# Patient Record
Sex: MALE | Race: WHITE | Employment: OTHER | URBAN - NONMETROPOLITAN AREA
[De-identification: names, ages, dates, MRNs, and addresses within clinical notes are randomized per-mention and may not be internally consistent; named-entity substitution may affect disease eponyms.]

---

## 2021-01-19 ENCOUNTER — HOSPITAL ENCOUNTER (INPATIENT)
Age: 81
LOS: 3 days | Discharge: HOME | DRG: 395 | End: 2021-01-22
Attending: SURGERY | Admitting: SURGERY
Payer: MEDICARE

## 2021-01-19 ENCOUNTER — APPOINTMENT (OUTPATIENT)
Dept: CT IMAGING | Age: 81
DRG: 395 | End: 2021-01-19
Attending: SURGERY
Payer: MEDICARE

## 2021-01-19 DIAGNOSIS — K63.89 PNEUMATOSIS INTESTINALIS: Primary | ICD-10-CM

## 2021-01-19 LAB
ANION GAP SERPL CALCULATED.3IONS-SCNC: 11 MMOL/L (ref 7–19)
BUN BLDV-MCNC: 27 MG/DL (ref 8–23)
CALCIUM SERPL-MCNC: 8.1 MG/DL (ref 8.8–10.2)
CHLORIDE BLD-SCNC: 115 MMOL/L (ref 98–111)
CO2: 15 MMOL/L (ref 22–29)
CREAT SERPL-MCNC: 1 MG/DL (ref 0.5–1.2)
GFR AFRICAN AMERICAN: >59
GFR NON-AFRICAN AMERICAN: >60
GLUCOSE BLD-MCNC: 85 MG/DL (ref 74–109)
POTASSIUM SERPL-SCNC: 4.3 MMOL/L (ref 3.5–5)
SODIUM BLD-SCNC: 141 MMOL/L (ref 136–145)

## 2021-01-19 PROCEDURE — 36415 COLL VENOUS BLD VENIPUNCTURE: CPT

## 2021-01-19 PROCEDURE — 99222 1ST HOSP IP/OBS MODERATE 55: CPT | Performed by: SURGERY

## 2021-01-19 PROCEDURE — 2580000003 HC RX 258: Performed by: SURGERY

## 2021-01-19 PROCEDURE — 6360000002 HC RX W HCPCS: Performed by: SURGERY

## 2021-01-19 PROCEDURE — 74174 CTA ABD&PLVS W/CONTRAST: CPT

## 2021-01-19 PROCEDURE — 6360000004 HC RX CONTRAST MEDICATION: Performed by: SURGERY

## 2021-01-19 PROCEDURE — 80048 BASIC METABOLIC PNL TOTAL CA: CPT

## 2021-01-19 PROCEDURE — 1210000000 HC MED SURG R&B

## 2021-01-19 PROCEDURE — 6370000000 HC RX 637 (ALT 250 FOR IP): Performed by: SURGERY

## 2021-01-19 RX ORDER — OMEPRAZOLE 20 MG/1
20 CAPSULE, DELAYED RELEASE ORAL DAILY
COMMUNITY
End: 2021-01-29

## 2021-01-19 RX ORDER — ONDANSETRON 2 MG/ML
4 INJECTION INTRAMUSCULAR; INTRAVENOUS EVERY 6 HOURS PRN
Status: DISCONTINUED | OUTPATIENT
Start: 2021-01-19 | End: 2021-01-22 | Stop reason: HOSPADM

## 2021-01-19 RX ORDER — SODIUM CHLORIDE 0.9 % (FLUSH) 0.9 %
10 SYRINGE (ML) INJECTION PRN
Status: DISCONTINUED | OUTPATIENT
Start: 2021-01-19 | End: 2021-01-22 | Stop reason: HOSPADM

## 2021-01-19 RX ORDER — ERGOCALCIFEROL 1.25 MG/1
50000 CAPSULE ORAL
Status: DISCONTINUED | OUTPATIENT
Start: 2021-01-19 | End: 2021-01-22 | Stop reason: HOSPADM

## 2021-01-19 RX ORDER — MEMANTINE HYDROCHLORIDE 5 MG/1
10 TABLET ORAL DAILY
Status: DISCONTINUED | OUTPATIENT
Start: 2021-01-19 | End: 2021-01-22 | Stop reason: HOSPADM

## 2021-01-19 RX ORDER — LEVOTHYROXINE SODIUM 0.05 MG/1
50 TABLET ORAL DAILY
Status: DISCONTINUED | OUTPATIENT
Start: 2021-01-19 | End: 2021-01-22 | Stop reason: HOSPADM

## 2021-01-19 RX ORDER — LISINOPRIL 5 MG/1
5 TABLET ORAL DAILY
COMMUNITY

## 2021-01-19 RX ORDER — ONDANSETRON 4 MG/1
4 TABLET, ORALLY DISINTEGRATING ORAL EVERY 8 HOURS PRN
Status: DISCONTINUED | OUTPATIENT
Start: 2021-01-19 | End: 2021-01-22 | Stop reason: HOSPADM

## 2021-01-19 RX ORDER — ERGOCALCIFEROL (VITAMIN D2) 1250 MCG
50000 CAPSULE ORAL
COMMUNITY
End: 2021-05-03 | Stop reason: ALTCHOICE

## 2021-01-19 RX ORDER — SODIUM CHLORIDE, SODIUM LACTATE, POTASSIUM CHLORIDE, CALCIUM CHLORIDE 600; 310; 30; 20 MG/100ML; MG/100ML; MG/100ML; MG/100ML
INJECTION, SOLUTION INTRAVENOUS CONTINUOUS
Status: DISCONTINUED | OUTPATIENT
Start: 2021-01-19 | End: 2021-01-22 | Stop reason: HOSPADM

## 2021-01-19 RX ORDER — SODIUM CHLORIDE 0.9 % (FLUSH) 0.9 %
10 SYRINGE (ML) INJECTION EVERY 12 HOURS SCHEDULED
Status: DISCONTINUED | OUTPATIENT
Start: 2021-01-19 | End: 2021-01-22 | Stop reason: HOSPADM

## 2021-01-19 RX ORDER — CHOLESTYRAMINE
4 POWDER (GRAM) MISCELLANEOUS DAILY
Status: ON HOLD | COMMUNITY
End: 2021-01-22 | Stop reason: HOSPADM

## 2021-01-19 RX ORDER — ACETAMINOPHEN 325 MG/1
325 TABLET ORAL EVERY 6 HOURS
Status: DISCONTINUED | OUTPATIENT
Start: 2021-01-19 | End: 2021-01-22 | Stop reason: HOSPADM

## 2021-01-19 RX ORDER — MORPHINE SULFATE 4 MG/ML
2 INJECTION, SOLUTION INTRAMUSCULAR; INTRAVENOUS
Status: DISCONTINUED | OUTPATIENT
Start: 2021-01-19 | End: 2021-01-22 | Stop reason: HOSPADM

## 2021-01-19 RX ORDER — ACETAMINOPHEN 325 MG/1
650 TABLET ORAL EVERY 4 HOURS PRN
COMMUNITY

## 2021-01-19 RX ORDER — MEMANTINE HYDROCHLORIDE 10 MG/1
10 TABLET ORAL 2 TIMES DAILY
COMMUNITY

## 2021-01-19 RX ORDER — LISINOPRIL 5 MG/1
5 TABLET ORAL DAILY
Status: DISCONTINUED | OUTPATIENT
Start: 2021-01-19 | End: 2021-01-22 | Stop reason: HOSPADM

## 2021-01-19 RX ORDER — METOPROLOL SUCCINATE 25 MG/1
25 TABLET, EXTENDED RELEASE ORAL 2 TIMES DAILY
Status: DISCONTINUED | OUTPATIENT
Start: 2021-01-19 | End: 2021-01-22 | Stop reason: HOSPADM

## 2021-01-19 RX ORDER — LEVOTHYROXINE SODIUM 0.05 MG/1
50 TABLET ORAL DAILY
COMMUNITY

## 2021-01-19 RX ORDER — PANTOPRAZOLE SODIUM 40 MG/1
40 TABLET, DELAYED RELEASE ORAL
Status: DISCONTINUED | OUTPATIENT
Start: 2021-01-20 | End: 2021-01-22 | Stop reason: HOSPADM

## 2021-01-19 RX ORDER — DOXYCYCLINE HYCLATE 100 MG/1
100 CAPSULE ORAL 2 TIMES DAILY
Status: ON HOLD | COMMUNITY
End: 2021-01-19 | Stop reason: ALTCHOICE

## 2021-01-19 RX ORDER — METOPROLOL SUCCINATE 25 MG/1
12.5 TABLET, EXTENDED RELEASE ORAL 2 TIMES DAILY
COMMUNITY

## 2021-01-19 RX ADMIN — SODIUM CHLORIDE, POTASSIUM CHLORIDE, SODIUM LACTATE AND CALCIUM CHLORIDE: 600; 310; 30; 20 INJECTION, SOLUTION INTRAVENOUS at 15:16

## 2021-01-19 RX ADMIN — SODIUM CHLORIDE, PRESERVATIVE FREE 10 ML: 5 INJECTION INTRAVENOUS at 20:44

## 2021-01-19 RX ADMIN — ACETAMINOPHEN 325 MG: 325 TABLET ORAL at 20:44

## 2021-01-19 RX ADMIN — MORPHINE SULFATE 2 MG: 4 INJECTION, SOLUTION INTRAMUSCULAR; INTRAVENOUS at 15:21

## 2021-01-19 RX ADMIN — IOPAMIDOL 90 ML: 755 INJECTION, SOLUTION INTRAVENOUS at 19:46

## 2021-01-19 ASSESSMENT — ENCOUNTER SYMPTOMS
SINUS PAIN: 0
VOMITING: 0
WHEEZING: 0
CONSTIPATION: 0
BLOOD IN STOOL: 0
DIARRHEA: 1
TROUBLE SWALLOWING: 0
SHORTNESS OF BREATH: 0
COLOR CHANGE: 0
ABDOMINAL DISTENTION: 0
NAUSEA: 0
ABDOMINAL PAIN: 1
SINUS PRESSURE: 0
COUGH: 0

## 2021-01-19 ASSESSMENT — PAIN SCALES - GENERAL
PAINLEVEL_OUTOF10: 7
PAINLEVEL_OUTOF10: 4
PAINLEVEL_OUTOF10: 2

## 2021-01-19 NOTE — H&P
[facility] General Surgery     History and Physical    Patient ID: Rosetta Fry  68 y.o.  male  YOB: 1940    Admitting Diagnosis: Small bowel obstruction    Subjective:      Mr. Fran Hernandez is a very pleasant 72-year-old gentleman accepted in transfer from [facility] this morning for evaluation of abdominal pain pneumatosis of the intestine. He notes that he over the last week or so he has had problems with abdominal pain and mild nausea. He reports a longstanding history of small bowel obstruction and is undergone surgery on several occasions for this. By his report he is not had to have any bowel resections. Beginning about a week ago he notes that he would eat breakfast and feel well would have lunch and feel well but by supper would feel full and bloated and not able to eat. During this. He has been having regular bowel movements and as well passing flatus. He denies fever or chills. He has not had any nausea or vomiting. He reports about a 20 pound weight loss over the last year or so. Last colonoscopy was about 6 years ago with a normal study and a recommendation for 10-year follow-up. He has not seen any blood in his stool. He notes no mucus or other abnormality. His wife reports that he has had 1 or 2 episodes of loose stools but not liquid diarrhea. After awakening this morning he noted that he just did not feel well and presented to the emergency department at [facility] in [place]. Work-up there showed him to be slightly tender. CT scan of the abdomen shows extensive pneumatosis involving the entire length of the small bowel as well as a portion of the colon. Given this finding he was transferred here for surgical evaluation.     Past Medical History:   Diagnosis Date    Arthritis     Asthma     Hypertension      Past Surgical History:   Procedure Laterality Date    ABDOMEN SURGERY      COLONOSCOPY  FRACTURE SURGERY       No current facility-administered medications on file prior to encounter. Current Outpatient Medications on File Prior to Encounter   Medication Sig Dispense Refill    acetaminophen (TYLENOL) 325 MG tablet Take 650 mg by mouth every 4 hours as needed for Pain      Cholestyramine POWD 4 g by Does not apply route daily      ergocalciferol (ERGOCALCIFEROL) 1.25 MG (53623 UT) capsule Take 50,000 Units by mouth twice a week       lisinopril (PRINIVIL;ZESTRIL) 5 MG tablet Take 5 mg by mouth daily      metoprolol succinate (TOPROL XL) 25 MG extended release tablet Take 25 mg by mouth 2 times daily Half tab      omeprazole (PRILOSEC) 20 MG delayed release capsule Take 20 mg by mouth daily      memantine (NAMENDA) 10 MG tablet Take 10 mg by mouth daily      levothyroxine (SYNTHROID) 50 MCG tablet Take 50 mcg by mouth Daily       Allergies: Penicillins    No family history on file. Social History     Tobacco Use    Smoking status: Former Smoker     Types: Cigarettes     Quit date: 1982     Years since quittin.0    Smokeless tobacco: Never Used   Substance Use Topics    Alcohol use: Not on file     Review of Systems   Constitutional: Positive for appetite change and unexpected weight change. Negative for activity change, chills, diaphoresis and fever. HENT: Positive for hearing loss. Negative for congestion, sinus pressure, sinus pain and trouble swallowing. Respiratory: Negative for cough, shortness of breath and wheezing. Cardiovascular: Negative for chest pain, palpitations and leg swelling. Gastrointestinal: Positive for abdominal pain and diarrhea. Negative for abdominal distention, blood in stool, constipation, nausea and vomiting. Genitourinary: Negative for difficulty urinating, frequency and urgency. Skin: Negative for color change. Psychiatric/Behavioral: Negative for confusion. The patient is not nervous/anxious.         Objective: BP 92/73   Pulse 80   Temp 97.6 °F (36.4 °C) (Temporal)   Resp 18   SpO2 94%   No intake or output data in the 24 hours ending 01/19/21 1449  Physical Exam  Vitals signs reviewed. Constitutional:       Appearance: Normal appearance. He is not ill-appearing. HENT:      Head: Normocephalic and atraumatic. Mouth/Throat:      Mouth: Mucous membranes are moist.      Pharynx: Oropharynx is clear. Eyes:      General: No scleral icterus. Extraocular Movements: Extraocular movements intact. Conjunctiva/sclera: Conjunctivae normal.   Neck:      Musculoskeletal: Neck supple. Cardiovascular:      Rate and Rhythm: Normal rate and regular rhythm. Heart sounds: Normal heart sounds. Comments: Heart sounds are faint and distant  Pulmonary:      Effort: Pulmonary effort is normal.      Breath sounds: Normal breath sounds. No wheezing or rales. Abdominal:      Comments: His abdomen is nondistended and soft. Well-healed midline incisions and without evidence of a hernia. No mass or organomegaly appreciated. No tenderness noted. Bowel sounds are normal in character. Neurological:      Mental Status: He is alert. Data: Laboratory studies obtained earlier this morning at [facility] are reviewed. Creatinine is 1.4 otherwise no pertinent findings noted. Imaging: I reviewed the CT images sent by disc from [facility].  There is extensive pneumatosis involving the small bowel and a portion of the colon. Small bowel loops are not distended but are fluid-filled. No transition zone noted. There is stool and gas within the colon all the way to the upper rectum.       Assessment: 1.  Mild abdominal pain with extensive associated pneumatosis intestinalis. No clinical, laboratory or radiologic evidence of bowel perforation or significant ischemia. Review of the CT does show some areas of calcified vessels in the abdomen and thus chronic mesenteric ischemia is not excluded. 2.  Given his presenting complaints and 20 pound weight loss an occult malignancy is also not excluded. 3.  Elevated serum creatinine. Unsure if this reflects mild dehydration or is his baseline as we have no prior laboratories for comparison. Plan:     1. IV fluid hydration  2.  CT angiogram of the abdomen to look for evidence of disease involving the superior mesenteric artery or celiac axis. 3.  He is clinically stable and does not require urgent laparotomy. 4.  Home medications have been reordered and appropriate hospital admission orders have been placed in [facility]. 5.  I spoke with . Marciano Hayden and his wife regarding my thoughts on his presentation and the next steps in his work-up and I answered their questions.     Randy Calixto MD

## 2021-01-20 PROBLEM — I10 ESSENTIAL HYPERTENSION: Status: ACTIVE | Noted: 2021-01-20

## 2021-01-20 PROBLEM — E03.8 OTHER SPECIFIED HYPOTHYROIDISM: Status: ACTIVE | Noted: 2021-01-20

## 2021-01-20 LAB
ALBUMIN SERPL-MCNC: 3.1 G/DL (ref 3.5–5.2)
ALP BLD-CCNC: 71 U/L (ref 40–130)
ALT SERPL-CCNC: 10 U/L (ref 5–41)
ANION GAP SERPL CALCULATED.3IONS-SCNC: 10 MMOL/L (ref 7–19)
AST SERPL-CCNC: 12 U/L (ref 5–40)
BASOPHILS ABSOLUTE: 0 K/UL (ref 0–0.2)
BASOPHILS RELATIVE PERCENT: 0.2 % (ref 0–1)
BILIRUB SERPL-MCNC: 0.4 MG/DL (ref 0.2–1.2)
BUN BLDV-MCNC: 26 MG/DL (ref 8–23)
CA 19-9: 9 U/ML (ref 0–35)
CALCIUM SERPL-MCNC: 8.3 MG/DL (ref 8.8–10.2)
CEA: 4.2 NG/ML (ref 0–4.7)
CHLORIDE BLD-SCNC: 111 MMOL/L (ref 98–111)
CO2: 20 MMOL/L (ref 22–29)
CREAT SERPL-MCNC: 1.1 MG/DL (ref 0.5–1.2)
EOSINOPHILS ABSOLUTE: 0.7 K/UL (ref 0–0.6)
EOSINOPHILS RELATIVE PERCENT: 13.3 % (ref 0–5)
GFR AFRICAN AMERICAN: >59
GFR NON-AFRICAN AMERICAN: >60
GLUCOSE BLD-MCNC: 72 MG/DL (ref 74–109)
HCT VFR BLD CALC: 41.1 % (ref 42–52)
HEMOGLOBIN: 13 G/DL (ref 14–18)
IMMATURE GRANULOCYTES #: 0 K/UL
LACTIC ACID: 0.7 MMOL/L (ref 0.5–1.9)
LYMPHOCYTES ABSOLUTE: 0.8 K/UL (ref 1.1–4.5)
LYMPHOCYTES RELATIVE PERCENT: 14.8 % (ref 20–40)
MCH RBC QN AUTO: 33.2 PG (ref 27–31)
MCHC RBC AUTO-ENTMCNC: 31.6 G/DL (ref 33–37)
MCV RBC AUTO: 105.1 FL (ref 80–94)
MONOCYTES ABSOLUTE: 0.4 K/UL (ref 0–0.9)
MONOCYTES RELATIVE PERCENT: 8.4 % (ref 0–10)
NEUTROPHILS ABSOLUTE: 3.2 K/UL (ref 1.5–7.5)
NEUTROPHILS RELATIVE PERCENT: 62.9 % (ref 50–65)
PDW BLD-RTO: 14.8 % (ref 11.5–14.5)
PLATELET # BLD: 297 K/UL (ref 130–400)
PMV BLD AUTO: 10.3 FL (ref 9.4–12.4)
POTASSIUM REFLEX MAGNESIUM: 4.3 MMOL/L (ref 3.5–5)
RBC # BLD: 3.91 M/UL (ref 4.7–6.1)
SODIUM BLD-SCNC: 141 MMOL/L (ref 136–145)
TOTAL PROTEIN: 4.7 G/DL (ref 6.6–8.7)
VITAMIN B-12: 391 PG/ML (ref 211–946)
WBC # BLD: 5.1 K/UL (ref 4.8–10.8)

## 2021-01-20 PROCEDURE — 80053 COMPREHEN METABOLIC PANEL: CPT

## 2021-01-20 PROCEDURE — 6370000000 HC RX 637 (ALT 250 FOR IP): Performed by: INTERNAL MEDICINE

## 2021-01-20 PROCEDURE — 99231 SBSQ HOSP IP/OBS SF/LOW 25: CPT | Performed by: SURGERY

## 2021-01-20 PROCEDURE — 6370000000 HC RX 637 (ALT 250 FOR IP): Performed by: SURGERY

## 2021-01-20 PROCEDURE — 2580000003 HC RX 258: Performed by: SURGERY

## 2021-01-20 PROCEDURE — 85025 COMPLETE CBC W/AUTO DIFF WBC: CPT

## 2021-01-20 PROCEDURE — 82607 VITAMIN B-12: CPT

## 2021-01-20 PROCEDURE — 82378 CARCINOEMBRYONIC ANTIGEN: CPT

## 2021-01-20 PROCEDURE — 36415 COLL VENOUS BLD VENIPUNCTURE: CPT

## 2021-01-20 PROCEDURE — 99222 1ST HOSP IP/OBS MODERATE 55: CPT | Performed by: INTERNAL MEDICINE

## 2021-01-20 PROCEDURE — 1210000000 HC MED SURG R&B

## 2021-01-20 PROCEDURE — 83605 ASSAY OF LACTIC ACID: CPT

## 2021-01-20 PROCEDURE — 6360000002 HC RX W HCPCS: Performed by: SURGERY

## 2021-01-20 PROCEDURE — 86301 IMMUNOASSAY TUMOR CA 19-9: CPT

## 2021-01-20 RX ORDER — METRONIDAZOLE 500 MG/1
500 TABLET ORAL EVERY 12 HOURS SCHEDULED
Status: DISCONTINUED | OUTPATIENT
Start: 2021-01-20 | End: 2021-01-22 | Stop reason: HOSPADM

## 2021-01-20 RX ORDER — BISACODYL 10 MG
10 SUPPOSITORY, RECTAL RECTAL ONCE
Status: COMPLETED | OUTPATIENT
Start: 2021-01-20 | End: 2021-01-20

## 2021-01-20 RX ADMIN — ACETAMINOPHEN 325 MG: 325 TABLET ORAL at 08:36

## 2021-01-20 RX ADMIN — LISINOPRIL 5 MG: 5 TABLET ORAL at 08:36

## 2021-01-20 RX ADMIN — METOPROLOL SUCCINATE 25 MG: 25 TABLET, EXTENDED RELEASE ORAL at 08:36

## 2021-01-20 RX ADMIN — MEMANTINE HYDROCHLORIDE 10 MG: 5 TABLET ORAL at 08:36

## 2021-01-20 RX ADMIN — ACETAMINOPHEN 325 MG: 325 TABLET ORAL at 20:19

## 2021-01-20 RX ADMIN — METOPROLOL SUCCINATE 25 MG: 25 TABLET, EXTENDED RELEASE ORAL at 20:19

## 2021-01-20 RX ADMIN — MORPHINE SULFATE 2 MG: 4 INJECTION, SOLUTION INTRAMUSCULAR; INTRAVENOUS at 00:41

## 2021-01-20 RX ADMIN — ACETAMINOPHEN 325 MG: 325 TABLET ORAL at 15:06

## 2021-01-20 RX ADMIN — MORPHINE SULFATE 2 MG: 4 INJECTION, SOLUTION INTRAMUSCULAR; INTRAVENOUS at 15:06

## 2021-01-20 RX ADMIN — MORPHINE SULFATE 2 MG: 4 INJECTION, SOLUTION INTRAMUSCULAR; INTRAVENOUS at 08:37

## 2021-01-20 RX ADMIN — SODIUM CHLORIDE, POTASSIUM CHLORIDE, SODIUM LACTATE AND CALCIUM CHLORIDE: 600; 310; 30; 20 INJECTION, SOLUTION INTRAVENOUS at 00:41

## 2021-01-20 RX ADMIN — METRONIDAZOLE 500 MG: 500 TABLET ORAL at 20:19

## 2021-01-20 RX ADMIN — BISACODYL 10 MG: 10 SUPPOSITORY RECTAL at 11:39

## 2021-01-20 RX ADMIN — LEVOTHYROXINE SODIUM 50 MCG: 50 TABLET ORAL at 05:16

## 2021-01-20 RX ADMIN — PANTOPRAZOLE SODIUM 40 MG: 40 TABLET, DELAYED RELEASE ORAL at 05:16

## 2021-01-20 ASSESSMENT — ENCOUNTER SYMPTOMS
ALLERGIC/IMMUNOLOGIC NEGATIVE: 1
BLOOD IN STOOL: 0
ABDOMINAL DISTENTION: 1
ABDOMINAL PAIN: 1
CHEST TIGHTNESS: 1
SHORTNESS OF BREATH: 1
EYES NEGATIVE: 1
VOMITING: 0
BACK PAIN: 1
DIARRHEA: 1

## 2021-01-20 ASSESSMENT — PAIN SCALES - GENERAL
PAINLEVEL_OUTOF10: 9
PAINLEVEL_OUTOF10: 9
PAINLEVEL_OUTOF10: 5
PAINLEVEL_OUTOF10: 1
PAINLEVEL_OUTOF10: 5

## 2021-01-20 NOTE — CONSULTS
Consult Note            Date:1/20/2021        Patient Name:Miles Mcneill     YOB: 1940     Age:80 y.o. Inpatient consult to GI  Consult performed by: Rohan Busch MD  Consult ordered by: Susana Robb DO  Reason for consult: Pneumatosis Intestinalis          Chief Complaint   No chief complaint on file. History Obtained From   Patient and wife    History of Present Illness    Mr. Geovanna Almonte is a pleasant 69 y/o male who presented to OSH with abdominal pain and distention. The patient reports that he has had multiple bowel obstructions in the past.  Per the wife the initial obstruction was felt to be secondary to a small bowel volvulus. The patient was taken to surgery however no resection was performed and the obstruction resolved spontaneously. His wife reports that he has had 4 surgeries for lysis of adhesions since that time. He last obstruction was a few years ago. He has had extensive GI evaluation in the past, predominantly when this problem began. He was evaluated in [place] and diagnosed with SIBO by Hydrogen Breath Test.  He did take antibiotics at that time which did help. He was also tested for Celiac disease which was negative. The patient does report chronic diarrhea and has had numerous stool studies for evaluation. He recently started taking Cholestyramine which seems to be helping. He also recently received IV antibiotics for a hand infection. He believes this also made his stomach feel better. This recent episode was been different from previous. He has not had significant pain and denies vomiting. His main symptom is abdominal distention.       Past Medical History     Past Medical History:   Diagnosis Date    Arthritis     Asthma     Hypertension     Subdural hematoma ([facility] [address] 08/2019    Thyroid disease         Past Surgical History     Past Surgical History:   Procedure Laterality Date    ABDOMEN SURGERY      COLONOSCOPY      FRACTURE SURGERY Medications     Prior to Admission medications    Medication Sig Start Date End Date Taking?  Authorizing Provider   acetaminophen (TYLENOL) 325 MG tablet Take 650 mg by mouth every 4 hours as needed for Pain   Yes Historical Provider, MD   Cholestyramine POWD 4 g by Does not apply route daily   Yes Historical Provider, MD   ergocalciferol (ERGOCALCIFEROL) 1.25 MG (67773 UT) capsule Take 50,000 Units by mouth twice a week tuesday thursday   Yes Historical Provider, MD   lisinopril (PRINIVIL;ZESTRIL) 5 MG tablet Take 5 mg by mouth daily   Yes Historical Provider, MD   metoprolol succinate (TOPROL XL) 25 MG extended release tablet Take 25 mg by mouth 2 times daily Half tab   Yes Historical Provider, MD   omeprazole (PRILOSEC) 20 MG delayed release capsule Take 20 mg by mouth daily   Yes Historical Provider, MD   memantine (NAMENDA) 10 MG tablet Take 10 mg by mouth daily   Yes Historical Provider, MD   levothyroxine (SYNTHROID) 50 MCG tablet Take 50 mcg by mouth Daily    Historical Provider, MD            vitamin D (ERGOCALCIFEROL) capsule 50,000 Units, Once per day on Mon Thu      levothyroxine (SYNTHROID) tablet 50 mcg, Daily      lisinopril (PRINIVIL;ZESTRIL) tablet 5 mg, Daily      memantine (NAMENDA) tablet 10 mg, Daily      metoprolol succinate (TOPROL XL) extended release tablet 25 mg, BID      pantoprazole (PROTONIX) tablet 40 mg, QAM AC      sodium chloride flush 0.9 % injection 10 mL, 2 times per day      sodium chloride flush 0.9 % injection 10 mL, PRN      ondansetron (ZOFRAN-ODT) disintegrating tablet 4 mg, Q8H PRN    Or      ondansetron (ZOFRAN) injection 4 mg, Q6H PRN      lactated ringers infusion, Continuous      acetaminophen (TYLENOL) tablet 325 mg, Q6H      morphine injection 2 mg, Q1H PRN        Allergies   Penicillins    Social History     Social History     Tobacco History     Smoking Status  Former Smoker Quit date  1/1/1982 Smoking Tobacco Type  Cigarettes    Smokeless Tobacco Use Never Used          Alcohol History     Alcohol Use Status  Not Asked          Drug Use     Drug Use Status  Not Asked          Sexual Activity     Sexually Active  Not Asked                Family History   No family history on file. Review of Systems   Review of Systems   Constitutional: Positive for appetite change and unexpected weight change. Negative for chills and fever. HENT: Negative. Eyes: Negative. Respiratory: Positive for chest tightness and shortness of breath. Cardiovascular: Positive for chest pain. Negative for leg swelling. Gastrointestinal: Positive for abdominal distention, abdominal pain and diarrhea. Negative for blood in stool and vomiting. Endocrine: Negative. Genitourinary: Negative. Musculoskeletal: Positive for arthralgias and back pain. Allergic/Immunologic: Negative. Neurological: Negative. Hematological: Negative. Psychiatric/Behavioral: Negative. Physical Exam   BP 95/66   Pulse 73   Temp 97.4 °F (36.3 °C) (Temporal)   Resp 18   SpO2 93%      Physical Exam  Vitals signs reviewed. Constitutional:       General: He is not in acute distress. HENT:      Head: Normocephalic. Mouth/Throat:      Mouth: Mucous membranes are dry. Eyes:      Pupils: Pupils are equal, round, and reactive to light. Neck:      Musculoskeletal: Neck supple. Cardiovascular:      Rate and Rhythm: Normal rate and regular rhythm. Heart sounds: Normal heart sounds. Pulmonary:      Effort: Pulmonary effort is normal.      Breath sounds: Normal breath sounds. Abdominal:      General: Bowel sounds are normal. There is no distension. Palpations: Abdomen is soft. There is no mass. Tenderness: There is no abdominal tenderness. Skin:     General: Skin is warm. Neurological:      General: No focal deficit present. Mental Status: He is alert.    Psychiatric:         Mood and Affect: Mood normal.         Labs    CBC:  Recent Labs     01/20/21 0502   WBC 5.1   RBC 3.91*   HGB 13.0*   HCT 41.1*   .1*   RDW 14.8*        CHEMISTRIES:  Recent Labs     01/19/21  1704 01/20/21  0502    141   K 4.3 4.3   * 111   CO2 15* 20*   BUN 27* 26*   CREATININE 1.0 1.1   GLUCOSE 85 72*     PT/INR:No results for input(s): PROTIME, INR in the last 72 hours. APTT:No results for input(s): APTT in the last 72 hours. LIVER PROFILE:  Recent Labs     01/20/21  0502   AST 12   ALT 10   BILITOT 0.4   ALKPHOS 71       Imaging/Diagnostics   Cta Abdomen Pelvis W Wo Contrast    Result Date: 1/19/2021  1. Extensive dilated small bowel with pneumatosis, which has mass effect on the stomach, colon and a small amount of decompressed distal right lower quadrant small bowel. Amount of pneumatosis limits evaluation for free air. Degree of small bowel dilation, paperthin bowel wall and pneumatosis are concerning for ischemia. However, other causes of pneumatosis are not excluded. 2. Ascending thoracic aorta enlarged measuring 4 cm. Vasculature patent without stenosis, dissection or aneurysm as described above. 3. Punctate left nonobstructing calculus.  Signed by Dr Davin Archibald on 1/19/2021 8:18 PM      Assessment      Hospital Problems           Last Modified POA    Pneumatosis intestinalis 1/19/2021 Yes    Other specified hypothyroidism 1/20/2021 Yes    Essential hypertension 1/20/2021 Yes Patient has had recurrent SBOs with multiple surgeries for lysis of adhesions. He was diagnosed with SIBO via Hydrogen Breath Test in [address] [place] [address] years ago. He does report chronic diarrhea which could be secondary to SIBO. He was treated with antibiotics at the time of diagnosis of SIBO and also believes that recent antibiotics taken for hand infection did help with his GI symptoms. He also is noted to have elevated MCV which may be indicative of B12 def which would also fit with a diagnosis of SIBO. He certainly does not appear toxic at this time and will likely respond well to conservative measures. Plan   1. Okay to begin Clear Liquid diet and advance to low FODMAPs diet. 2.  Begin taking Flagyl 500 mg BID for 14 days and would consider cycling this every 3 months. 3.  Check serum B12. Will follow.     Electronically signed by Anand Sierra MD on 1/20/21 at 2:43 PM CST

## 2021-01-20 NOTE — PROGRESS NOTES
[facility] General Surgery Progress Note    Chief Complaint: abd pain    SUBJECTIVE:  Mr. Mau Brown is a 68 y.o. male is seen and examined at bedside. He notes mild LLQ abdominal pain this morning that resolved with medication. He passed flatus yesterday but denies that today. He is tolerating sips and ice chips. He is wanting soup to eat. He states he had a colonoscopy 6 years ago that was normal. He is without nausea. He has not had  BM. OBJECTIVE:  /76   Pulse 80   Temp 97.1 °F (36.2 °C) (Temporal)   Resp 18   SpO2 94%   CONSTITUTIONAL: Alert, appropriate, no acute distress  SKIN: warm, dry with no rashes or lesions  HEENT: NCAT, Non icteric, PERR. Trachea Midline. CHEST/LUNGS: CTA bilaterally. Normal respiratory effort. CARDIOVASCULAR: RRR, No edema. ABDOMEN: soft, ND, minimally TTP, +BS, right sided pain pump in place. NEUROLOGIC: CN II-XI grossly intact, no motor or sensory deficits   MUSCULOSKELETAL: No clubbing or cyanosis. PSYCHIATRIC:  Normal Mood and Affect. Alert and Oriented. Labs:  CBC:   Recent Labs     01/20/21  0502   WBC 5.1   HGB 13.0*        BMP:    Recent Labs     01/19/21  1704 01/20/21  0502    141   K 4.3 4.3   * 111   CO2 15* 20*   BUN 27* 26*   CREATININE 1.0 1.1   GLUCOSE 85 72*     Lactic Acid   Lactic Acid 0.7  0.5 - 1.9 mmol/L Final 01/20/2021  5:02 AM [facility]       CTA 1/19/2021      Impression   1. Extensive dilated small bowel with pneumatosis, which has mass   effect on the stomach, colon and a small amount of decompressed distal   right lower quadrant small bowel. Amount of pneumatosis limits   evaluation for free air. Degree of small bowel dilation, paperthin   bowel wall and pneumatosis are concerning for ischemia. However, other   causes of pneumatosis are not excluded. 2. Ascending thoracic aorta enlarged measuring 4 cm. Vasculature   patent without stenosis, dissection or aneurysm as described above. 3. Punctate left nonobstructing calculus. Signed by Dr Surendra Roger on 1/19/2021 8:18 PM           ASSESSMENT:    Pneumatosis intestinalis--unknown cause. Other specified hypothyroidism    Essential hypertension      PLAN:  No clear signs of ischemia or warranting an emergent or urgent laparotomy. Will consult GI for further evaluation, as it is not clear as to a cause of his symptoms or recent significant weight loss. If GI is not opposed or planning intervention, can start clear liquid diet. Will give dulcolax suppository to assist with BM. Continue medications to manage chronic medical problems.      Sanaz Regan DO   Electronically Signed on 1/20/2021 at 10:45 AM

## 2021-01-21 ENCOUNTER — APPOINTMENT (OUTPATIENT)
Dept: GENERAL RADIOLOGY | Age: 81
DRG: 395 | End: 2021-01-21
Attending: SURGERY
Payer: MEDICARE

## 2021-01-21 LAB
ANION GAP SERPL CALCULATED.3IONS-SCNC: 10 MMOL/L (ref 7–19)
BASOPHILS ABSOLUTE: 0 K/UL (ref 0–0.2)
BASOPHILS RELATIVE PERCENT: 0.2 % (ref 0–1)
BUN BLDV-MCNC: 21 MG/DL (ref 8–23)
CALCIUM SERPL-MCNC: 8 MG/DL (ref 8.8–10.2)
CHLORIDE BLD-SCNC: 109 MMOL/L (ref 98–111)
CO2: 19 MMOL/L (ref 22–29)
CREAT SERPL-MCNC: 0.9 MG/DL (ref 0.5–1.2)
EOSINOPHILS ABSOLUTE: 0.5 K/UL (ref 0–0.6)
EOSINOPHILS RELATIVE PERCENT: 8.9 % (ref 0–5)
GFR AFRICAN AMERICAN: >59
GFR NON-AFRICAN AMERICAN: >60
GLUCOSE BLD-MCNC: 98 MG/DL (ref 74–109)
HCT VFR BLD CALC: 37.8 % (ref 42–52)
HEMOGLOBIN: 12.4 G/DL (ref 14–18)
IMMATURE GRANULOCYTES #: 0 K/UL
LYMPHOCYTES ABSOLUTE: 0.6 K/UL (ref 1.1–4.5)
LYMPHOCYTES RELATIVE PERCENT: 9.6 % (ref 20–40)
MCH RBC QN AUTO: 33.5 PG (ref 27–31)
MCHC RBC AUTO-ENTMCNC: 32.8 G/DL (ref 33–37)
MCV RBC AUTO: 102.2 FL (ref 80–94)
MONOCYTES ABSOLUTE: 0.6 K/UL (ref 0–0.9)
MONOCYTES RELATIVE PERCENT: 10.7 % (ref 0–10)
NEUTROPHILS ABSOLUTE: 4.1 K/UL (ref 1.5–7.5)
NEUTROPHILS RELATIVE PERCENT: 70.4 % (ref 50–65)
PDW BLD-RTO: 14.7 % (ref 11.5–14.5)
PLATELET # BLD: 312 K/UL (ref 130–400)
PMV BLD AUTO: 10.1 FL (ref 9.4–12.4)
POTASSIUM REFLEX MAGNESIUM: 3.8 MMOL/L (ref 3.5–5)
RBC # BLD: 3.7 M/UL (ref 4.7–6.1)
SODIUM BLD-SCNC: 138 MMOL/L (ref 136–145)
WBC # BLD: 5.8 K/UL (ref 4.8–10.8)

## 2021-01-21 PROCEDURE — 85025 COMPLETE CBC W/AUTO DIFF WBC: CPT

## 2021-01-21 PROCEDURE — 99231 SBSQ HOSP IP/OBS SF/LOW 25: CPT | Performed by: SURGERY

## 2021-01-21 PROCEDURE — 6370000000 HC RX 637 (ALT 250 FOR IP): Performed by: SURGERY

## 2021-01-21 PROCEDURE — 1210000000 HC MED SURG R&B

## 2021-01-21 PROCEDURE — 2580000003 HC RX 258: Performed by: SURGERY

## 2021-01-21 PROCEDURE — 6360000002 HC RX W HCPCS: Performed by: SURGERY

## 2021-01-21 PROCEDURE — 80048 BASIC METABOLIC PNL TOTAL CA: CPT

## 2021-01-21 PROCEDURE — 36415 COLL VENOUS BLD VENIPUNCTURE: CPT

## 2021-01-21 PROCEDURE — 6370000000 HC RX 637 (ALT 250 FOR IP): Performed by: INTERNAL MEDICINE

## 2021-01-21 PROCEDURE — 74018 RADEX ABDOMEN 1 VIEW: CPT

## 2021-01-21 RX ORDER — BISACODYL 10 MG
10 SUPPOSITORY, RECTAL RECTAL ONCE
Status: COMPLETED | OUTPATIENT
Start: 2021-01-21 | End: 2021-01-21

## 2021-01-21 RX ADMIN — LISINOPRIL 5 MG: 5 TABLET ORAL at 08:53

## 2021-01-21 RX ADMIN — PANTOPRAZOLE SODIUM 40 MG: 40 TABLET, DELAYED RELEASE ORAL at 05:30

## 2021-01-21 RX ADMIN — SODIUM CHLORIDE, PRESERVATIVE FREE 10 ML: 5 INJECTION INTRAVENOUS at 21:55

## 2021-01-21 RX ADMIN — ERGOCALCIFEROL 50000 UNITS: 1.25 CAPSULE ORAL at 21:54

## 2021-01-21 RX ADMIN — ACETAMINOPHEN 325 MG: 325 TABLET ORAL at 08:53

## 2021-01-21 RX ADMIN — MEMANTINE HYDROCHLORIDE 10 MG: 5 TABLET ORAL at 08:53

## 2021-01-21 RX ADMIN — METOPROLOL SUCCINATE 25 MG: 25 TABLET, EXTENDED RELEASE ORAL at 21:54

## 2021-01-21 RX ADMIN — ACETAMINOPHEN 325 MG: 325 TABLET ORAL at 21:54

## 2021-01-21 RX ADMIN — METRONIDAZOLE 500 MG: 500 TABLET ORAL at 21:54

## 2021-01-21 RX ADMIN — METOPROLOL SUCCINATE 25 MG: 25 TABLET, EXTENDED RELEASE ORAL at 08:53

## 2021-01-21 RX ADMIN — LEVOTHYROXINE SODIUM 50 MCG: 50 TABLET ORAL at 05:30

## 2021-01-21 RX ADMIN — BISACODYL 10 MG: 10 SUPPOSITORY RECTAL at 14:14

## 2021-01-21 RX ADMIN — SODIUM CHLORIDE, POTASSIUM CHLORIDE, SODIUM LACTATE AND CALCIUM CHLORIDE: 600; 310; 30; 20 INJECTION, SOLUTION INTRAVENOUS at 21:55

## 2021-01-21 RX ADMIN — MORPHINE SULFATE 2 MG: 4 INJECTION, SOLUTION INTRAMUSCULAR; INTRAVENOUS at 09:00

## 2021-01-21 RX ADMIN — METRONIDAZOLE 500 MG: 500 TABLET ORAL at 08:53

## 2021-01-21 ASSESSMENT — ENCOUNTER SYMPTOMS
SHORTNESS OF BREATH: 0
ALLERGIC/IMMUNOLOGIC NEGATIVE: 1
COUGH: 0
BACK PAIN: 1
NAUSEA: 0
EYES NEGATIVE: 1
ABDOMINAL DISTENTION: 1
VOMITING: 0
BLOOD IN STOOL: 0

## 2021-01-21 ASSESSMENT — PAIN SCALES - GENERAL
PAINLEVEL_OUTOF10: 10
PAINLEVEL_OUTOF10: 4

## 2021-01-21 ASSESSMENT — PAIN - FUNCTIONAL ASSESSMENT: PAIN_FUNCTIONAL_ASSESSMENT: ACTIVITIES ARE NOT PREVENTED

## 2021-01-21 NOTE — PROGRESS NOTES
Progress Note  Date:2021       Room:0528/528-01  Patient Name:Miles Mcneill     YOB: 1940     Age:80 y.o. Subjective    Subjective:  Symptoms:  Improved. No shortness of breath or cough. Diet:  Poor intake. No nausea or vomiting. Pain:  He complains of pain that is mild. He reports pain is improving. Review of Systems   Constitutional: Negative for fever. HENT: Negative. Eyes: Negative. Respiratory: Negative for cough and shortness of breath. Gastrointestinal: Positive for abdominal distention. Negative for blood in stool, nausea and vomiting. Endocrine: Negative. Genitourinary: Negative. Musculoskeletal: Positive for arthralgias, back pain and neck pain. Allergic/Immunologic: Negative. Hematological: Negative. Psychiatric/Behavioral: Negative. Objective         Vitals Last 24 Hours:  TEMPERATURE:  Temp  Av.5 °F (36.4 °C)  Min: 97.1 °F (36.2 °C)  Max: 98.3 °F (36.8 °C)  RESPIRATIONS RANGE: Resp  Avg: 15.5  Min: 14  Max: 17  PULSE OXIMETRY RANGE: SpO2  Av %  Min: 90 %  Max: 99 %  PULSE RANGE: Pulse  Av.3  Min: 59  Max: 96  BLOOD PRESSURE RANGE: Systolic (78YYQ), OMB:898 , Min:97 , UJS:973   ; Diastolic (66EIK), NKK:80, Min:69, Max:82    I/O (24Hr): Intake/Output Summary (Last 24 hours) at 2021 1454  Last data filed at 2021 1449  Gross per 24 hour   Intake 2675 ml   Output 800 ml   Net 1875 ml     Objective:  General Appearance:  Comfortable. Vital signs: (most recent): Blood pressure 123/77, pulse 59, temperature 98.3 °F (36.8 °C), resp. rate 14, SpO2 93 %. No fever. Lungs:  Normal effort. Heart: Normal rate. Abdomen: Abdomen is distended. Hyperactive bowel sounds. There is no abdominal tenderness. Neurological: Patient is alert. Pupils:  Pupils are equal, round, and reactive to light. Skin:  Warm.       Labs/Imaging/Diagnostics    Labs:  CBC:  Recent Labs     21  0502 21  8671 WBC 5.1 5.8   RBC 3.91* 3.70*   HGB 13.0* 12.4*   HCT 41.1* 37.8*   .1* 102.2*   RDW 14.8* 14.7*    312     CHEMISTRIES:  Recent Labs     01/19/21  1704 01/20/21  0502 01/21/21  0604    141 138   K 4.3 4.3 3.8   * 111 109   CO2 15* 20* 19*   BUN 27* 26* 21   CREATININE 1.0 1.1 0.9   GLUCOSE 85 72* 98     PT/INR:No results for input(s): PROTIME, INR in the last 72 hours. APTT:No results for input(s): APTT in the last 72 hours.   LIVER PROFILE:  Recent Labs     01/20/21  0502   AST 12   ALT 10   BILITOT 0.4   ALKPHOS 71       Imaging Last 24 Hours:  Cta Abdomen Pelvis W Wo Contrast    Result Date: 1/19/2021 EXAM: CTA ABDOMEN PELVIS W WO CONTRAST -- 1/19/2021 6:41 PM HISTORY: 68 years, Male, abdominal pain, pneumatosis of small bowel COMPARISON: No existing relevant imaging studies available DLP: 2522 mGy cm. Automated exposure control was utilized to minimize patient radiation dose. TECHNIQUE: Unenhanced and enhanced CT images obtained of the abdomen and pelvis with multiplanar reformats. 3D postprocessing, including MIPs, performed and images saved to PACS. FINDINGS: LUNG BASES: No acute findings of the lung bases. No inferior pericardial or pleural effusion. Arterial phase of imaging limits evaluation of solid organs. LIVER: The liver is normal in size and contour. GALLBLADDER and BILARY: The gallbladder is within normal limits. No radiodense cholelithiasis. No intra-or extrahepatic biliary ductal dilatation. PANCREAS: Atrophic. SPLEEN: Normal size and enhancement. ADRENALS: No adrenal mass. URINARY: No hydronephrosis. There appears to be a punctate left nonobstructing calculus. Bilateral renal low-density lesions, not optimally evaluated on this exam, most commonly renal cysts. . The urinary bladder is normal in appearance. Borderline prostate size with metallic densities, which may represent sequelae of prior prostate cancer therapy. PERITONEUM: There is a markedly amount of small bowel pneumatosis, which limits evaluation for free air. BOWEL: Stomach appears compressed by markedly dilated loops of bowel with pneumatosis. There is a moderate to large amount of stool in the colon. No definite pneumatosis involving the colon. There is some decompressed loops of small bowel at the right lower quadrant without convincing pneumatosis. Appendix is not discretely identified. RETROPERITONEUM:  Ascending thoracic aorta enlarged measuring 4 cm. Celiac, conventional hepatic, splenic arteries are patent. Superior mesenteric artery patent. 2 right renal, single left renal and inferior mesenteric arteries are patent.  Bilateral common iliac, internal iliac, external iliac and common femoral arteries are patent. Partially visualized bilateral superficial and deep femoral arteries patent with calcified atherosclerosis. No critical stenosis, dissection or aneurysm. No abdominal or pelvic adenopathy. ABDOMINAL WALL: Implant of mechanical device at the right anterior abdominal wall. Changes of prior ventral hernia repair. Surgical clips at the bilateral low pelvis anterior soft tissues. OSSEOUS: Degenerative changes of the lumbar spine. No acute bony finding. 1. Extensive dilated small bowel with pneumatosis, which has mass effect on the stomach, colon and a small amount of decompressed distal right lower quadrant small bowel. Amount of pneumatosis limits evaluation for free air. Degree of small bowel dilation, paperthin bowel wall and pneumatosis are concerning for ischemia. However, other causes of pneumatosis are not excluded. 2. Ascending thoracic aorta enlarged measuring 4 cm. Vasculature patent without stenosis, dissection or aneurysm as described above. 3. Punctate left nonobstructing calculus. Signed by Dr Aida Julian on 1/19/2021 8:18 PM    Assessment//Plan           Hospital Problems           Last Modified POA    Pneumatosis intestinalis 1/19/2021 Yes    Other specified hypothyroidism 1/20/2021 Yes    Essential hypertension 1/20/2021 Yes        Assessment:    Condition: In stable condition. Improving. (Patient feeling slightly better but still having some focal LLQ pain. Will repeat KUB, try to advance diet. In meantime continue Flagyl. ).        Electronically signed by Stephanie Murdock MD on 1/21/21 at 2:54 PM CST

## 2021-01-21 NOTE — PROGRESS NOTES
[facility] General Surgery Progress Note    Chief Complaint: abdominal pain    SUBJECTIVE:  Mr. Kelsea Ashford is a 68 y.o. male is seen and examined at bedside. He continues to have 10/10 pain, without a clear cause. He is tolerating clear liquids without vomiting. He has passed flatus. He had a BM yesterday without blood in it. He denies fever, chills, chest pain, SOB, n/v.      OBJECTIVE:  /77   Pulse 59   Temp 98.3 °F (36.8 °C)   Resp 14   SpO2 93%   CONSTITUTIONAL: Alert, appropriate, no acute distress  SKIN: warm, dry with no rashes or lesions  HEENT: NCAT, Non icteric, PERR. Trachea Midline. CHEST/LUNGS: CTA bilaterally. Normal respiratory effort. CARDIOVASCULAR: RRR, No edema. ABDOMEN: soft, ND, no clear areas of tenderness, +BS  NEUROLOGIC: CN II-XI grossly intact, no motor or sensory deficits   MUSCULOSKELETAL: No clubbing or cyanosis. PSYCHIATRIC:  Normal Mood and Affect. Alert and Oriented. Labs:  CBC:   Recent Labs     01/20/21  0502 01/21/21  0604   WBC 5.1 5.8   HGB 13.0* 12.4*    312     BMP:    Recent Labs     01/19/21  1704 01/20/21  0502 01/21/21  0604    141 138   K 4.3 4.3 3.8   * 111 109   CO2 15* 20* 19*   BUN 27* 26* 21   CREATININE 1.0 1.1 0.9   GLUCOSE 85 72* 98       ASSESSMENT:  Active Problems:    Pneumatosis intestinalis    Other specified hypothyroidism    Essential hypertension  Resolved Problems:    * No resolved hospital problems. *      PLAN:  Advance to full liquid. Another dulcolax suppository. Anticipate discharge this afternoon or tomorrow. Patient and his wife note understanding. He has f/u with his pain management doctor on the 25th.      Deion Quinteros, DO   Electronically Signed on 1/21/2021 at 1:36 PM

## 2021-01-21 NOTE — PROGRESS NOTES
[address] [place] is being assessed upon: Admission    I agree that Jennifer Hernandez, along with gonsalo rn   (either 2 RN's or 1 LPN and 1 RN) have performed a thorough Head to Toe Skin Assessment on the patient. ALL assessment sites listed below have been assessed. Areas assessed by both nurses:     [x]   Head, Face, and Ears   [x]   Shoulders, Back, and Chest  [x]   Arms, Elbows, and Hands   [x]   Coccyx, Sacrum, and Ischium  [x]   Legs, Feet, and Heels    Does the Patient have Skin Breakdown?  No    Julian Prevention initiated: No  Wound Care Orders initiated: No    WOC nurse consulted for Pressure Injury (Stage 3,4, Unstageable, DTI, NWPT, and Complex wounds) and New or Established Ostomies: No        Primary Nurse eSignature: Neal James RN on 1/21/2021 at 12:35 AM      Co-Signer eSignature: {Esignature:[id number]}

## 2021-01-22 ENCOUNTER — TELEPHONE (OUTPATIENT)
Dept: GASTROENTEROLOGY | Age: 81
End: 2021-01-22

## 2021-01-22 VITALS
RESPIRATION RATE: 16 BRPM | TEMPERATURE: 97.6 F | HEART RATE: 64 BPM | OXYGEN SATURATION: 93 % | SYSTOLIC BLOOD PRESSURE: 127 MMHG | DIASTOLIC BLOOD PRESSURE: 81 MMHG

## 2021-01-22 PROCEDURE — 99238 HOSP IP/OBS DSCHRG MGMT 30/<: CPT | Performed by: SURGERY

## 2021-01-22 PROCEDURE — 6370000000 HC RX 637 (ALT 250 FOR IP): Performed by: SURGERY

## 2021-01-22 PROCEDURE — 6370000000 HC RX 637 (ALT 250 FOR IP): Performed by: INTERNAL MEDICINE

## 2021-01-22 RX ORDER — METRONIDAZOLE 500 MG/1
500 TABLET ORAL EVERY 12 HOURS SCHEDULED
Qty: 24 TABLET | Refills: 0 | Status: SHIPPED | OUTPATIENT
Start: 2021-01-22 | End: 2021-02-03

## 2021-01-22 RX ADMIN — ACETAMINOPHEN 325 MG: 325 TABLET ORAL at 08:57

## 2021-01-22 RX ADMIN — MEMANTINE HYDROCHLORIDE 10 MG: 5 TABLET ORAL at 08:57

## 2021-01-22 RX ADMIN — PANTOPRAZOLE SODIUM 40 MG: 40 TABLET, DELAYED RELEASE ORAL at 05:59

## 2021-01-22 RX ADMIN — LISINOPRIL 5 MG: 5 TABLET ORAL at 08:57

## 2021-01-22 RX ADMIN — METRONIDAZOLE 500 MG: 500 TABLET ORAL at 08:57

## 2021-01-22 RX ADMIN — METOPROLOL SUCCINATE 25 MG: 25 TABLET, EXTENDED RELEASE ORAL at 08:58

## 2021-01-22 RX ADMIN — LEVOTHYROXINE SODIUM 50 MCG: 50 TABLET ORAL at 05:59

## 2021-01-22 RX ADMIN — ACETAMINOPHEN 325 MG: 325 TABLET ORAL at 03:35

## 2021-01-22 ASSESSMENT — PAIN SCALES - GENERAL: PAINLEVEL_OUTOF10: 0

## 2021-01-22 NOTE — DISCHARGE INSTR - DIET
? Good nutrition is important when healing from an illness, injury, or surgery. Follow any nutrition recommendations given to you during your hospital stay. ? If you were given an oral nutrition supplement while in the hospital, continue to take this supplement at home. You can take it with meals, in-between meals, and/or before bedtime. These supplements can be purchased at most local grocery stores, pharmacies, and chain [employer]-stores. ? If you have any questions about your diet or nutrition, call the hospital and ask for the dietitian. ? Resume a full liquid diet and then advance as tolerated.

## 2021-01-22 NOTE — DISCHARGE SUMMARY
Physician Discharge Summary     Patient ID:  Bina Bishop  [id number]  68 y.o. Admit date: 1/19/2021    Discharge date and time: 01/22/21 9:59 AM      Admitting Physician: Raven Kang MD     Admission Diagnoses: Pneumatosis intestinalis [K63.89]    Discharge Diagnoses:   Patient Active Problem List   Diagnosis    Pneumatosis intestinalis    Other specified hypothyroidism    Essential hypertension       Discharged Condition: fair    Hospital Course: Mr. Rj Javier was transferred to [facility] from an outside facility after findings of pneumatosis intestinalis were present on a CT scan while being worked up for abdominal pain. He however was found to be non-toxic and did not appear to have findings consistent with ischemia. He was treated conservatively and CTA confirmed no mesenteric ischemia that was identifiable. He was started on a diet and tolerated. He has had several bowel movements. He is, a this point, stable for discharge with outpatient GI follow-up. Consults: GI    Significant Diagnostic Studies: labs:    CBC:  Recent Labs     01/20/21  0502 01/21/21  0604   WBC 5.1 5.8   RBC 3.91* 3.70*   HGB 13.0* 12.4*   HCT 41.1* 37.8*   .1* 102.2*   MCH 33.2* 33.5*   MCHC 31.6* 32.8*   RDW 14.8* 14.7*    312   MPV 10.3 10.1        Imaging as per medical record. Disposition: home    Patient Instructions: Activity: activity as tolerated  Diet: Full liquid diet and advance at home as tolerated. Wound Care: none needed    Follow-up with GI in 2 weeks.     Bob Singh DO  8/64/2658  9:38 AM

## 2021-01-29 ENCOUNTER — OFFICE VISIT (OUTPATIENT)
Dept: GASTROENTEROLOGY | Age: 81
End: 2021-01-29
Payer: MEDICARE

## 2021-01-29 VITALS
BODY MASS INDEX: 22.07 KG/M2 | DIASTOLIC BLOOD PRESSURE: 80 MMHG | HEIGHT: 69 IN | OXYGEN SATURATION: 99 % | HEART RATE: 67 BPM | SYSTOLIC BLOOD PRESSURE: 120 MMHG | WEIGHT: 149 LBS

## 2021-01-29 DIAGNOSIS — K63.89 PNEUMATOSIS INTESTINALIS: ICD-10-CM

## 2021-01-29 DIAGNOSIS — K63.89 SMALL INTESTINAL BACTERIAL OVERGROWTH: Primary | ICD-10-CM

## 2021-01-29 PROCEDURE — G8427 DOCREV CUR MEDS BY ELIG CLIN: HCPCS | Performed by: NURSE PRACTITIONER

## 2021-01-29 PROCEDURE — 99214 OFFICE O/P EST MOD 30 MIN: CPT | Performed by: NURSE PRACTITIONER

## 2021-01-29 PROCEDURE — G8484 FLU IMMUNIZE NO ADMIN: HCPCS | Performed by: NURSE PRACTITIONER

## 2021-01-29 PROCEDURE — G8420 CALC BMI NORM PARAMETERS: HCPCS | Performed by: NURSE PRACTITIONER

## 2021-01-29 PROCEDURE — 1111F DSCHRG MED/CURRENT MED MERGE: CPT | Performed by: NURSE PRACTITIONER

## 2021-01-29 PROCEDURE — 1036F TOBACCO NON-USER: CPT | Performed by: NURSE PRACTITIONER

## 2021-01-29 PROCEDURE — 1123F ACP DISCUSS/DSCN MKR DOCD: CPT | Performed by: NURSE PRACTITIONER

## 2021-01-29 PROCEDURE — 4040F PNEUMOC VAC/ADMIN/RCVD: CPT | Performed by: NURSE PRACTITIONER

## 2021-01-29 RX ORDER — DENOSUMAB 60 MG/ML
60 INJECTION SUBCUTANEOUS ONCE
COMMUNITY

## 2021-01-29 RX ORDER — METRONIDAZOLE 500 MG/1
500 TABLET ORAL 2 TIMES DAILY
Qty: 28 TABLET | Refills: 0 | Status: SHIPPED | OUTPATIENT
Start: 2021-04-30 | End: 2021-03-18 | Stop reason: SDUPTHER

## 2021-01-29 RX ORDER — PANTOPRAZOLE SODIUM 40 MG/1
40 GRANULE, DELAYED RELEASE ORAL
COMMUNITY

## 2021-01-29 ASSESSMENT — ENCOUNTER SYMPTOMS
RECTAL PAIN: 0
NAUSEA: 0
SHORTNESS OF BREATH: 0
TROUBLE SWALLOWING: 0
CONSTIPATION: 0
ANAL BLEEDING: 0
CHOKING: 0
DIARRHEA: 0
ABDOMINAL PAIN: 0
ABDOMINAL DISTENTION: 0
COUGH: 0
BLOOD IN STOOL: 0
VOMITING: 0

## 2021-01-29 NOTE — PROGRESS NOTES
Subjective:     Patient ID: Deep Romero is a 68 y.o. male  PCP: Dion Mederos  Referring Provider: No ref. provider found    HPI  Patient presents to the office today with the following complaints: New Patient (seen in hospital)      Pt here today for hospital follow up. Pt admitted last week for pneumatosis of the intestine. Several ileus and small bowel obstructions thought to be due to SIBO. Symptoms begin as left side abdominal pain and abdominal distention. He has used antibiotics in the past with some improvement. He was also seen in [place] in the past.  Wife states he has been dealing with this for 20 years. Pt was seen by Dr. Raudel Page while inpatient and antibiotic therapy every 3 months was recommended. He is here today to discuss this. He is currently taking Flagyl. Today, he denies any abdominal pain and distention. Reports daily BM without any blood in stool or melena. Denies any nausea and vomiting. He is tolerating regular diet. Assessment:     1. Small intestinal bacterial overgrowth    2. Pneumatosis intestinalis            Plan:   - Continue current Flagyl treatment  - Plan for Flagyl 500 mg po BID x 14 days every 3 months. This was discussed with Dr. Raudel Page today  - Follow up in 3-4 months or sooner if needed, ok for VV    Orders  No orders of the defined types were placed in this encounter.     Medications  Orders Placed This Encounter   Medications    metroNIDAZOLE (FLAGYL) 500 MG tablet     Sig: Take 1 tablet by mouth 2 times daily for 14 days     Dispense:  28 tablet     Refill:  0         Patient History:     Past Medical History:   Diagnosis Date    Arthritis     Asthma     Hypertension     Subdural hematoma ([facility] [address] 08/2019    Thyroid disease        Past Surgical History:   Procedure Laterality Date    ABDOMEN SURGERY      COLONOSCOPY  2013    Dr Mo Welch @ [address] [place] [address] Gi- normal per pt recall   [address] ENDOSCOPY  2013    Dr Mo Welch @ [facility] GI- normal       Family History   Problem Relation Age of Onset    Colon Polyps Neg Hx     Colon Cancer Neg Hx        Social History     Socioeconomic History    Marital status:      Spouse name: None    Number of children: None    Years of education: None    Highest education level: None   Occupational History    None   Social Needs    Financial resource strain: None    Food insecurity     Worry: None     Inability: None    Transportation needs     Medical: None     Non-medical: None   Tobacco Use    Smoking status: Former Smoker     Types: Cigarettes     Quit date: 1982     Years since quittin.1    Smokeless tobacco: Never Used   Substance and Sexual Activity    Alcohol use: Never     Frequency: Never    Drug use: None    Sexual activity: None   Lifestyle    Physical activity     Days per week: None     Minutes per session: None    Stress: None   Relationships    Social connections     Talks on phone: None     Gets together: None     Attends Jewish service: None     Active member of club or organization: None     Attends meetings of clubs or organizations: None     Relationship status: None    Intimate partner violence     Fear of current or ex partner: None     Emotionally abused: None     Physically abused: None     Forced sexual activity: None   Other Topics Concern    None   Social History Narrative    None       Current Outpatient Medications   Medication Sig Dispense Refill    pantoprazole sodium (PROTONIX) 40 MG PACK packet Take 40 mg by mouth every morning (before breakfast)      denosumab (PROLIA) 60 MG/ML SOSY SC injection Inject 60 mg into the skin once Every 6 months      MORPHINE SULFATE IM Inject 1.09 mg PE/day into the muscle every 24 hours Pain pump      [START ON 2021] metroNIDAZOLE (FLAGYL) 500 MG tablet Take 1 tablet by mouth 2 times daily for 14 days 28 tablet 0    metroNIDAZOLE (FLAGYL) 500 MG tablet Take 1 tablet by mouth every 12

## 2021-03-18 ENCOUNTER — TELEPHONE (OUTPATIENT)
Dept: GASTROENTEROLOGY | Age: 81
End: 2021-03-18

## 2021-03-18 RX ORDER — METRONIDAZOLE 500 MG/1
500 TABLET ORAL 2 TIMES DAILY
Qty: 28 TABLET | Refills: 0 | Status: SHIPPED | OUTPATIENT
Start: 2021-03-18 | End: 2021-05-04 | Stop reason: SDUPTHER

## 2021-03-18 NOTE — TELEPHONE ENCOUNTER
Yes, let's go ahead and begin the Flagyl now. If diarrhea continues may need sooner appt. Do I need to send in another script to start now, or will the standing order work?

## 2021-03-18 NOTE — TELEPHONE ENCOUNTER
Thank you Jamel Talley and his wife have been notified, said they will let you know how the diarrhea is going.   sonia

## 2021-05-04 ENCOUNTER — VIRTUAL VISIT (OUTPATIENT)
Dept: GASTROENTEROLOGY | Age: 81
End: 2021-05-04
Payer: MEDICARE

## 2021-05-04 DIAGNOSIS — K63.89 SMALL INTESTINAL BACTERIAL OVERGROWTH: Primary | ICD-10-CM

## 2021-05-04 PROCEDURE — 99441 PR PHYS/QHP TELEPHONE EVALUATION 5-10 MIN: CPT | Performed by: NURSE PRACTITIONER

## 2021-05-04 RX ORDER — METRONIDAZOLE 500 MG/1
TABLET ORAL
Qty: 28 TABLET | Refills: 3 | Status: SHIPPED | OUTPATIENT
Start: 2021-06-04 | End: 2021-11-05 | Stop reason: SDUPTHER

## 2021-05-04 ASSESSMENT — ENCOUNTER SYMPTOMS
ANAL BLEEDING: 0
DIARRHEA: 1
COUGH: 0
VOMITING: 0
SHORTNESS OF BREATH: 0
RECTAL PAIN: 0
NAUSEA: 0
TROUBLE SWALLOWING: 0
CHOKING: 0
ABDOMINAL DISTENTION: 0
ABDOMINAL PAIN: 0
CONSTIPATION: 0
BLOOD IN STOOL: 0

## 2021-05-04 NOTE — PROGRESS NOTES
2021    TELEHEALTH EVALUATION -- Audio/Visual (During RECYU-18 public health emergency)    HPI:    Estuardo Mendoza (:  1940) has requested an audio/video evaluation for the following concern(s):  Chief Complaint   Patient presents with    Follow-up         Pt seen today for follow up. Hx SIBO. He is on regimen of Flagyl 500 mg po BID x 14 days every 3 months. Pt called office in March with c/o diarrhea, up  To 9 BM a day. Flagyl was started at that time. Today, pt denies any diarrhea, abdominal pain. He reports good appetite, however, unable to gain any weight. Next Flagyl treatment will be due in . Pt denies any further complaints today. LAST HPI 2021  Pt here today for hospital follow up. Pt admitted last week for pneumatosis of the intestine. Several ileus and small bowel obstructions thought to be due to SIBO. Symptoms begin as left side abdominal pain and abdominal distention. He has used antibiotics in the past with some improvement. He was also seen in [place] in the past.  Wife states he has been dealing with this for 20 years. Pt was seen by Dr. Lambert Wallace while inpatient and antibiotic therapy every 3 months was recommended. He is here today to discuss this. He is currently taking Flagyl. Today, he denies any abdominal pain and distention. Reports daily BM without any blood in stool or melena. Denies any nausea and vomiting. He is tolerating regular diet    Due to technical difficulties, visit was conducted over the phone    Review of Systems   Constitutional: Negative for activity change, appetite change, fatigue, fever and unexpected weight change. HENT: Negative for trouble swallowing. Respiratory: Negative for cough, choking and shortness of breath. Cardiovascular: Negative for chest pain. Gastrointestinal: Positive for diarrhea.  Negative for abdominal distention, abdominal pain, anal bleeding, blood in stool, constipation, nausea, rectal pain and vomiting. Allergic/Immunologic: Negative for food allergies. All other systems reviewed and are negative. Prior to Visit Medications    Medication Sig Taking?  Authorizing Provider   Cholecalciferol (VITAMIN D3) 125 MCG (5000 UT) TABS Take by mouth daily  Historical Provider, MD   pantoprazole sodium (PROTONIX) 40 MG PACK packet Take 40 mg by mouth every morning (before breakfast)  Historical Provider, MD   denosumab (PROLIA) 60 MG/ML SOSY SC injection Inject 60 mg into the skin once Every 6 months  Historical Provider, MD   MORPHINE SULFATE IM Inject 1.09 mg PE/day into the muscle every 24 hours Pain pump  Historical Provider, MD   acetaminophen (TYLENOL) 325 MG tablet Take 650 mg by mouth every 4 hours as needed for Pain  Historical Provider, MD   lisinopril (PRINIVIL;ZESTRIL) 5 MG tablet Take 5 mg by mouth daily  Historical Provider, MD   metoprolol succinate (TOPROL XL) 25 MG extended release tablet Take 25 mg by mouth 2 times daily Half tab  Historical Provider, MD   memantine (NAMENDA) 10 MG tablet Take 10 mg by mouth daily  Historical Provider, MD   levothyroxine (SYNTHROID) 50 MCG tablet Take 50 mcg by mouth Daily  Historical Provider, MD       Social History     Tobacco Use    Smoking status: Former Smoker     Types: Cigarettes     Quit date: 1982     Years since quittin.3    Smokeless tobacco: Never Used   Substance Use Topics    Alcohol use: Yes     Frequency: Never     Comment: 3-4 beers per year     Drug use: Never        Allergies   Allergen Reactions    Penicillins Other (See Comments)     unknown   ,   Past Medical History:   Diagnosis Date    Arthritis     Asthma     Hypertension     Subdural hematoma ([facility] [address] 2019    Thyroid disease    ,   Past Surgical History:   Procedure Laterality Date    ABDOMEN SURGERY      COLONOSCOPY      Dr Kal Churchill @ [address] [place] [address] Gi- normal per pt recall   [address] ENDOSCOPY      Dr Kal Churchill @ [address] [facility]- normal   ,   Social History     Tobacco Use    Smoking status: Former Smoker     Types: Cigarettes     Quit date: 1982     Years since quittin.3    Smokeless tobacco: Never Used   Substance Use Topics    Alcohol use: Yes     Frequency: Never     Comment: 3-4 beers per year     Drug use: Never   ,   Family History   Problem Relation Age of Onset    Colon Polyps Neg Hx     Colon Cancer Neg Hx     Esophageal Cancer Neg Hx     Liver Cancer Neg Hx     Rectal Cancer Neg Hx     Stomach Cancer Neg Hx        PHYSICAL EXAMINATION not completed due to technical difficulties    ASSESSMENT/PLAN:  1. Small intestinal bacterial overgrowth    - Flagyl 500 mg BID x 14 days every 3 months, next dose due in    - Follow up in 6 months or sooner if needed  - Call with any questions or concerns       Return in about 6 months (around 2021). Melani Sow, was evaluated through a synchronous (real-time) audio-video encounter. The patient (or guardian if applicable) is aware that this is a billable service. Verbal consent to proceed has been obtained within the past 12 months. The visit was conducted pursuant to the emergency declaration under the [address] [place] [address], [address] [place] [address] authority and the [employer] General Act. Patient identification was verified, and a caregiver was present when appropriate. The patient was located in a state where the provider was credentialed to provide care. Total time spent on this encounter: Not billed by time    --DB Cruz - NP on 2021 at 8:27 AM    An electronic signature was used to authenticate this note.

## 2021-11-05 ENCOUNTER — VIRTUAL VISIT (OUTPATIENT)
Dept: GASTROENTEROLOGY | Age: 81
End: 2021-11-05
Payer: MEDICARE

## 2021-11-05 DIAGNOSIS — K63.89 SMALL INTESTINAL BACTERIAL OVERGROWTH: Primary | ICD-10-CM

## 2021-11-05 PROCEDURE — 99213 OFFICE O/P EST LOW 20 MIN: CPT | Performed by: NURSE PRACTITIONER

## 2021-11-05 PROCEDURE — 1036F TOBACCO NON-USER: CPT | Performed by: NURSE PRACTITIONER

## 2021-11-05 PROCEDURE — 1123F ACP DISCUSS/DSCN MKR DOCD: CPT | Performed by: NURSE PRACTITIONER

## 2021-11-05 PROCEDURE — G8484 FLU IMMUNIZE NO ADMIN: HCPCS | Performed by: NURSE PRACTITIONER

## 2021-11-05 PROCEDURE — G8428 CUR MEDS NOT DOCUMENT: HCPCS | Performed by: NURSE PRACTITIONER

## 2021-11-05 PROCEDURE — 4040F PNEUMOC VAC/ADMIN/RCVD: CPT | Performed by: NURSE PRACTITIONER

## 2021-11-05 PROCEDURE — G8420 CALC BMI NORM PARAMETERS: HCPCS | Performed by: NURSE PRACTITIONER

## 2021-11-05 RX ORDER — METRONIDAZOLE 500 MG/1
TABLET ORAL
Qty: 28 TABLET | Refills: 3 | Status: SHIPPED | OUTPATIENT
Start: 2021-11-05 | End: 2022-03-29 | Stop reason: ALTCHOICE

## 2021-11-05 ASSESSMENT — ENCOUNTER SYMPTOMS
NAUSEA: 0
CONSTIPATION: 0
RECTAL PAIN: 0
CHOKING: 0
ABDOMINAL PAIN: 0
BLOOD IN STOOL: 0
TROUBLE SWALLOWING: 0
ANAL BLEEDING: 0
DIARRHEA: 0
COUGH: 0
ABDOMINAL DISTENTION: 0
SHORTNESS OF BREATH: 0
VOMITING: 0

## 2021-11-05 NOTE — PROGRESS NOTES
2021    TELEHEALTH EVALUATION -- Audio/Visual (During CWXBO-40 public health emergency)    HPI:    Ivan Cummings (:  1940) has requested an audio/video evaluation for the following concern(s):  Chief Complaint   Patient presents with    Follow-up       Pt seen today for follow up. Hx SIBO, currently on regimen of Flagyl 500 mg BID x 14 days every 3 months. This is a chronic issue for patient. He would have bouts of diarrhea for several years. Hx ileus and SBO due to SIBO. Today, pt denies any diarrhea. He reports BM daily. Denies any abdominal pain or rectal bleeding. He denies any further GI complaints or concerns. Review of Systems   Constitutional: Negative for activity change, appetite change, fatigue, fever and unexpected weight change. HENT: Negative for trouble swallowing. Respiratory: Negative for cough, choking and shortness of breath. Cardiovascular: Negative for chest pain. Gastrointestinal: Negative for abdominal distention, abdominal pain, anal bleeding, blood in stool, constipation, diarrhea, nausea, rectal pain and vomiting. Allergic/Immunologic: Negative for food allergies. All other systems reviewed and are negative. Prior to Visit Medications    Medication Sig Taking? Authorizing Provider   metroNIDAZOLE (FLAGYL) 500 MG tablet Take 1 tablet two times daily for 14 days.   Repeat every 3 months  DB Faulkner NP   Cholecalciferol (VITAMIN D3) 125 MCG (5000 UT) TABS Take by mouth daily  Historical Provider, MD   pantoprazole sodium (PROTONIX) 40 MG PACK packet Take 40 mg by mouth every morning (before breakfast)  Historical Provider, MD   denosumab (PROLIA) 60 MG/ML SOSY SC injection Inject 60 mg into the skin once Every 6 months  Historical Provider, MD   MORPHINE SULFATE IM Inject 1.09 mg PE/day into the muscle every 24 hours Pain pump  Historical Provider, MD   acetaminophen (TYLENOL) 325 MG tablet Take 650 mg by mouth every 4 hours as needed for Pain  Historical Provider, MD   lisinopril (PRINIVIL;ZESTRIL) 5 MG tablet Take 5 mg by mouth daily  Historical Provider, MD   metoprolol succinate (TOPROL XL) 25 MG extended release tablet Take 12.5 mg by mouth 2 times daily Half tab   Historical Provider, MD   memantine (NAMENDA) 10 MG tablet Take 10 mg by mouth daily  Historical Provider, MD   levothyroxine (SYNTHROID) 50 MCG tablet Take 50 mcg by mouth Daily  Historical Provider, MD       Social History     Tobacco Use    Smoking status: Former Smoker     Types: Cigarettes     Quit date: 1982     Years since quittin.8    Smokeless tobacco: Never Used   Vaping Use    Vaping Use: Never used   Substance Use Topics    Alcohol use: Yes     Comment: 3-4 beers per year     Drug use: Never        Allergies   Allergen Reactions    Forteo [Parathyroid Hormone (Recomb)]     Penicillins Other (See Comments)     unknown    Ciprofloxacin Rash   ,   Past Medical History:   Diagnosis Date    Arthritis     Asthma     Hypertension     Subdural [facility]oma ([facility] [address] 2019    Thyroid disease    ,   Past Surgical History:   Procedure Laterality Date    ABDOMEN SURGERY      COLONOSCOPY      Dr Jagdeep Frost @ [facility]- normal per pt recall   [facility] ENDOS[facility]      Dr Jagdeep Frost @ [address] [place] [address] GI- normal   ,   Social History     Tobacco Use    Smoking status: Former Smoker     Types: Cigarettes     Quit date: 1982     Years since quittin.8    Smokeless tobacco: Never Used   Vaping Use    Vaping Use: Never used   Substance Use Topics    Alcohol use: Yes     Comment: 3-4 beers per year     Drug use: Never   ,   Family History   Problem Relation Age of Onset    Colon Polyps Neg Hx     Colon Cancer Neg Hx     Esophageal Cancer Neg Hx     Liver Cancer Neg Hx     Rectal Cancer Neg Hx     Stomach Cancer Neg Hx        PHYSICAL EXAMINATION:  [ INSTRUCTIONS:  \"[x]\" Indicates a positive item  \"[]\" Indicates a negative item -- DELETE ALL ITEMS NOT EXAMINED]  Vital Signs: (As obtained by patient/caregiver or practitioner observation)    Blood pressure-  Heart rate-    Respiratory rate-    Temperature-  Pulse oximetry-     Constitutional: [x] Appears well-developed and well-nourished [x] No apparent distress      [] Abnormal-   Mental status  [x] Alert and awake  [x] Oriented to person/place/time [x]Able to follow commands      Eyes:  EOM    [x]  Normal  [] Abnormal-  Sclera  [x]  Normal  [] Abnormal -         Discharge [x]  None visible  [] Abnormal -    HENT:   [x] Normocephalic, atraumatic. [] Abnormal   [x] Mouth/Throat: Mucous membranes are moist.     External Ears [x] Normal  [] Abnormal-     Neck: [x] No visualized mass     Pulmonary/Chest: [x] Respiratory effort normal.  [] No visualized signs of difficulty breathing or respiratory distress        [] Abnormal-      Musculoskeletal:   [x] Normal gait with no signs of ataxia         [x] Normal range of motion of neck        [] Abnormal-       Neurological:        [x] No Facial Asymmetry (Cranial nerve 7 motor function) (limited exam to video visit)          [x] No gaze palsy        [] Abnormal-         Skin:        [x] No significant exanthematous lesions or discoloration noted on facial skin         [] Abnormal-            Psychiatric:       [x] Normal Affect [x] No Hallucinations        [] Abnormal-     Other pertinent observable physical exam findings-     ASSESSMENT/PLAN:  1. Small intestinal bacterial overgrowth    - Continue Flagyl 500 BID x 14 days every 3 months  - Follow up in 6 months or sooner if needed  - Call with any questions or concerns      Return in about 6 months (around 5/5/2022). Melani Sow, was evaluated through a synchronous (real-time) audio-video encounter. The patient (or guardian if applicable) is aware that this is a billable service. Verbal consent to proceed has been obtained within the past 12 months.  The visit was conducted pursuant to the emergency declaration under the [facility], [address] [place] [address] authority and the [employer] General Act. Patient identification was verified, and a caregiver was present when appropriate. The patient was located in a state where the provider was credentialed to provide care. Total time spent on this encounter: Not billed by time    --DB Roberts NP on 11/5/2021 at 8:11 AM    An electronic signature was used to authenticate this note.

## 2022-03-03 ENCOUNTER — TELEPHONE (OUTPATIENT)
Dept: GASTROENTEROLOGY | Age: 82
End: 2022-03-03

## 2022-03-03 DIAGNOSIS — K63.89 PNEUMATOSIS INTESTINALIS: ICD-10-CM

## 2022-03-03 DIAGNOSIS — K58.0 IRRITABLE BOWEL SYNDROME WITH DIARRHEA: ICD-10-CM

## 2022-03-03 DIAGNOSIS — K63.89 SMALL INTESTINAL BACTERIAL OVERGROWTH: Primary | ICD-10-CM

## 2022-03-03 NOTE — TELEPHONE ENCOUNTER
Miles's wife Jesus Lizama requests that  A nurse return their call. Pt is having issue with diarrhea, could not schedule vv due [place] of [place]. The best time to reach him is Anytime. Thank you.

## 2022-03-03 NOTE — TELEPHONE ENCOUNTER
It may be time to switch antibiotics. I would recommend Xifaxan 550 TID x 14 days. If effective, will continue every 3 months. Keep follow up appt.   I will send script to pharmacy

## 2022-03-03 NOTE — TELEPHONE ENCOUNTER
ASSESSMENT/PLAN:  1. Small intestinal bacterial overgrowth     - Continue Flagyl 500 BID x 14 days every 3 months  - Follow up in 6 months or sooner if needed  - Call with any questions or concerns        Return in about 6 months (around 5/5/2022). [facility], the wife said at present they cannot do V V. Hx of diarrhea and SBBO , he is currently taking Flagyl 500 mg Bid x 14 days again which was started on 2-28-22 per the wife, she said Miles's diarrhea has worsened with a very yellow frothy stool about 5-6 times a day, he does not report any blood or pain at present but just very tired of this diarrhea and said he feels dehydrated, he will be sipping on Gatorade for a bit. The patient requested a FU which I now have the patient [facility] on 3-29-22 @ 11:20 am. The patient wants to know if there is anything he can or needs to do at present and is requesting a return call. I will forward to Jairo Manning and DEMI scott to review.  Jorge scott

## 2022-03-03 NOTE — TELEPHONE ENCOUNTER
Thanks Elder Cheung, I have notified the patient's wife and I am working on the PA as we speak, hopefully it will for through for this patient.   sonia

## 2022-03-29 ENCOUNTER — OFFICE VISIT (OUTPATIENT)
Dept: GASTROENTEROLOGY | Age: 82
End: 2022-03-29
Payer: MEDICARE

## 2022-03-29 VITALS
SYSTOLIC BLOOD PRESSURE: 120 MMHG | HEIGHT: 70 IN | DIASTOLIC BLOOD PRESSURE: 80 MMHG | BODY MASS INDEX: 20.47 KG/M2 | HEART RATE: 74 BPM | WEIGHT: 143 LBS | OXYGEN SATURATION: 99 %

## 2022-03-29 DIAGNOSIS — K63.89 SMALL INTESTINAL BACTERIAL OVERGROWTH: Primary | ICD-10-CM

## 2022-03-29 DIAGNOSIS — K58.0 IRRITABLE BOWEL SYNDROME WITH DIARRHEA: ICD-10-CM

## 2022-03-29 PROCEDURE — G8420 CALC BMI NORM PARAMETERS: HCPCS | Performed by: NURSE PRACTITIONER

## 2022-03-29 PROCEDURE — 1123F ACP DISCUSS/DSCN MKR DOCD: CPT | Performed by: NURSE PRACTITIONER

## 2022-03-29 PROCEDURE — 1036F TOBACCO NON-USER: CPT | Performed by: NURSE PRACTITIONER

## 2022-03-29 PROCEDURE — 4040F PNEUMOC VAC/ADMIN/RCVD: CPT | Performed by: NURSE PRACTITIONER

## 2022-03-29 PROCEDURE — 99213 OFFICE O/P EST LOW 20 MIN: CPT | Performed by: NURSE PRACTITIONER

## 2022-03-29 PROCEDURE — G8484 FLU IMMUNIZE NO ADMIN: HCPCS | Performed by: NURSE PRACTITIONER

## 2022-03-29 PROCEDURE — G8427 DOCREV CUR MEDS BY ELIG CLIN: HCPCS | Performed by: NURSE PRACTITIONER

## 2022-03-29 RX ORDER — TIZANIDINE 4 MG/1
4 TABLET ORAL EVERY 8 HOURS PRN
COMMUNITY
End: 2022-09-29 | Stop reason: ALTCHOICE

## 2022-03-29 ASSESSMENT — ENCOUNTER SYMPTOMS
ANAL BLEEDING: 0
DIARRHEA: 1
TROUBLE SWALLOWING: 0
ABDOMINAL DISTENTION: 0
CHOKING: 0
VOMITING: 0
BLOOD IN STOOL: 0
CONSTIPATION: 0
RECTAL PAIN: 0
COUGH: 0
SHORTNESS OF BREATH: 0
ABDOMINAL PAIN: 0
NAUSEA: 0

## 2022-03-29 NOTE — PROGRESS NOTES
Subjective:     Patient ID: Domi Herrera is a 80 y.o. male  PCP: Shaji Denney  Referring Provider: No ref. provider found    HPI  Patient presents to the office today with the following complaints: Diarrhea      Pt seen today for c/o diarrhea. Hx SIBO, currently on Flagyl 500 mg x 14 days every 3 months. Pt's wife called office earlier this month stating pt began having more diarrhea. Trial of Xifaxan was recommended. However, due to pharmacy issues, wife states this was not started. Since that time, they have continued the Flagyl and the diarrhea has resolved. Diarrhea lasted for 5-6 days, described as type 7 on stool chart. Currently, pt is having 1-2 stools a day, type 4. He denies any abdominal pain or blood in stool. Assessment:     1. Small intestinal bacterial overgrowth    2. Irritable bowel syndrome with diarrhea            Plan:   - Continue Flagyl every 3 months   - Consider use of Xifaxan 2-3 times per year if needed   - Follow up in 6 months or sooner if needed  - Call with any questions or concerns     Orders  No orders of the defined types were placed in this encounter. Medications  No orders of the defined types were placed in this encounter.         Patient History:     Past Medical History:   Diagnosis Date    Arthritis     Asthma     Hypertension     Subdural hematoma ([facility] [address] 08/2019    Thyroid disease        Past Surgical History:   Procedure Laterality Date    ABDOMEN SURGERY      COLONOSCOPY  2013    Dr Callie Calixto @ [facility]- normal per pt recall   [id number] ENDOSCOPY  2013    Dr Callie Calixto @ [facility] GI- normal       Family History   Problem Relation Age of Onset    Colon Polyps Neg Hx     Colon Cancer Neg Hx     Esophageal Cancer Neg Hx     Liver Cancer Neg Hx     Rectal Cancer Neg Hx     Stomach Cancer Neg Hx        Social History     Socioeconomic History    Marital status:      Spouse name: None    Number of children: None    Years of education: None    Highest education level: None   Occupational History    None   Tobacco Use    Smoking status: Former Smoker     Types: Cigarettes     Quit date: 1982     Years since quittin.2    Smokeless tobacco: Never Used   Vaping Use    Vaping Use: Never used   Substance and Sexual Activity    Alcohol use: Not Currently    Drug use: Never    Sexual activity: None   Other Topics Concern    None   Social History Narrative    None     Social Determinants of Health     Financial Resource Strain:     Difficulty of Paying Living Expenses: Not on file   Food Insecurity:     Worried About Running Out of Food in the Last Year: Not on file    Chase of Food in the Last Year: Not on file   Transportation Needs:     Lack of Transportation (Medical): Not on file    Lack of Transportation (Non-Medical):  Not on file   Physical Activity:     Days of Exercise per Week: Not on file    Minutes of Exercise per Session: Not on file   Stress:     Feeling of Stress : Not on file   Social Connections:     Frequency of Communication with Friends and Family: Not on file    Frequency of Social Gatherings with Friends and Family: Not on file    Attends [employer] Services: Not on file    Active Member of [address] [place] [address] [employer] or Organizations: Not on file    Attends Club or Organization Meetings: Not on file    Marital Status: Not on file   Intimate Partner Violence:     Fear of Current or Ex-Partner: Not on file    Emotionally Abused: Not on file    Physically Abused: Not on file    Sexually Abused: Not on file   Housing Stability:     Unable to Pay for Housing in the Last Year: Not on file    Number of [place] in the Last Year: Not on file    Unstable Housing in the Last Year: Not on file       Current Outpatient Medications   Medication Sig Dispense Refill    tiZANidine (ZANAFLEX) 4 MG tablet Take 4 mg by mouth every 8 hours as needed      Multiple Vitamin (MULTI-VITAMIN DAILY PO) Take 3 tablets by mouth daily      diphenhydrAMINE HCl (BENADRYL PO) Take by mouth as needed      Cholecalciferol (VITAMIN D3) 125 MCG (5000 UT) TABS Take by mouth daily      pantoprazole sodium (PROTONIX) 40 MG PACK packet Take 40 mg by mouth every morning (before breakfast)      denosumab (PROLIA) 60 MG/ML SOSY SC injection Inject 60 mg into the skin once Every 6 months      MORPHINE SULFATE IM Inject 1.09 mg PE/day into the muscle every 24 hours Pain pump      acetaminophen (TYLENOL) 325 MG tablet Take 650 mg by mouth every 4 hours as needed for Pain      lisinopril (PRINIVIL;ZESTRIL) 5 MG tablet Take 5 mg by mouth daily      metoprolol succinate (TOPROL XL) 25 MG extended release tablet Take 12.5 mg by mouth 2 times daily Half tab       memantine (NAMENDA) 10 MG tablet Take 10 mg by mouth 2 times daily       levothyroxine (SYNTHROID) 50 MCG tablet Take 50 mcg by mouth Daily       No current facility-administered medications for this visit. Allergies   Allergen Reactions    Forteo [Parathyroid Hormone (Recomb)] Other (See Comments)     Confusion  weakness      Penicillins Other (See Comments)     unknown    Ciprofloxacin Rash       Review of Systems   Constitutional: Negative for activity change, appetite change, fatigue, fever and unexpected weight change. HENT: Negative for trouble swallowing. Respiratory: Negative for cough, choking and shortness of breath. Cardiovascular: Negative for chest pain. Gastrointestinal: Positive for diarrhea (improved). Negative for abdominal distention, abdominal pain, anal bleeding, blood in stool, constipation, nausea, rectal pain and vomiting. Allergic/Immunologic: Negative for food allergies. Neurological: Positive for weakness. All other systems reviewed and are negative.         Objective:     /80 (Site: Right Upper Arm)   Pulse 74   Ht 5' 10\" (1.778 m)   Wt 143 lb (64.9 kg)   SpO2 99%   BMI 20.52 kg/m²     Physical Exam  Vitals reviewed. Constitutional:       General: He is not in acute distress. Appearance: He is well-developed. HENT:      Head: Normocephalic and atraumatic. Right Ear: External ear normal.      Left Ear: External ear normal.      Nose: Nose normal.      Comments: Mask on     Mouth/Throat:      Comments: Mask on  Eyes:      General: No scleral icterus. Right eye: No discharge. Left eye: No discharge. Conjunctiva/sclera: Conjunctivae normal.      Pupils: Pupils are equal, round, and reactive to light. Cardiovascular:      Rate and Rhythm: Normal rate and regular rhythm. Heart sounds: Normal heart sounds. No murmur heard. Pulmonary:      Effort: Pulmonary effort is normal. No respiratory distress. Breath sounds: Normal breath sounds. No wheezing or rales. Abdominal:      General: Bowel sounds are normal. There is no distension. Palpations: Abdomen is soft. There is no mass. Tenderness: There is no abdominal tenderness. There is no guarding or rebound. Musculoskeletal:         General: Normal range of motion. Cervical back: Normal range of motion and neck supple. Skin:     General: Skin is warm and dry. Coloration: Skin is not pale. Neurological:      Mental Status: He is alert and oriented to person, place, and time. Motor: Weakness (generalized) present.    Psychiatric:         Behavior: Behavior normal.

## 2022-09-29 ENCOUNTER — OFFICE VISIT (OUTPATIENT)
Dept: GASTROENTEROLOGY | Age: 82
End: 2022-09-29
Payer: MEDICARE

## 2022-09-29 VITALS
OXYGEN SATURATION: 88 % | HEIGHT: 69 IN | WEIGHT: 138.8 LBS | HEART RATE: 109 BPM | SYSTOLIC BLOOD PRESSURE: 122 MMHG | BODY MASS INDEX: 20.56 KG/M2 | DIASTOLIC BLOOD PRESSURE: 70 MMHG

## 2022-09-29 DIAGNOSIS — K58.0 IRRITABLE BOWEL SYNDROME WITH DIARRHEA: ICD-10-CM

## 2022-09-29 DIAGNOSIS — K63.89 SMALL INTESTINAL BACTERIAL OVERGROWTH: Primary | ICD-10-CM

## 2022-09-29 PROCEDURE — 99213 OFFICE O/P EST LOW 20 MIN: CPT | Performed by: NURSE PRACTITIONER

## 2022-09-29 PROCEDURE — G8427 DOCREV CUR MEDS BY ELIG CLIN: HCPCS | Performed by: NURSE PRACTITIONER

## 2022-09-29 PROCEDURE — G8420 CALC BMI NORM PARAMETERS: HCPCS | Performed by: NURSE PRACTITIONER

## 2022-09-29 PROCEDURE — 1123F ACP DISCUSS/DSCN MKR DOCD: CPT | Performed by: NURSE PRACTITIONER

## 2022-09-29 PROCEDURE — 1036F TOBACCO NON-USER: CPT | Performed by: NURSE PRACTITIONER

## 2022-09-29 RX ORDER — METRONIDAZOLE 500 MG/1
TABLET ORAL
Qty: 28 TABLET | Refills: 3 | Status: SHIPPED | OUTPATIENT
Start: 2022-09-29

## 2022-09-29 ASSESSMENT — ENCOUNTER SYMPTOMS
ABDOMINAL PAIN: 0
ANAL BLEEDING: 0
DIARRHEA: 1
SHORTNESS OF BREATH: 0
BLOOD IN STOOL: 0
VOMITING: 0
NAUSEA: 0
ABDOMINAL DISTENTION: 0
TROUBLE SWALLOWING: 0
CONSTIPATION: 0
CHOKING: 0
COUGH: 0
RECTAL PAIN: 0

## 2022-09-29 NOTE — PROGRESS NOTES
Subjective:     Patient ID: Leighton Amor is a 80 y.o. male  PCP: Ledy Dennis  Referring Provider: No ref. provider found    HPI  Patient presents to the office today with the following complaints: Follow-up      Pt seen today for follow up. Hx SIBO, chronic diarrhea. Currently taking Flagyl 500 mg x 14 days every 3 months. He reports diarrhea about once a week or will go several weeks without diarrhea. He states during flares diarrhea will continue for a few days. He has lost 5 lbs since last visit. However, his wife was recently diagnosed with metastatic lung cancer and has been in and out of hospital.  He denies any abdominal pain or blood ins stool. Assessment:     1. Small intestinal bacterial overgrowth    2. Irritable bowel syndrome with diarrhea            Plan:   - Continue Flagyl 500 BID x 14 days every 3 months, refills provided  - Follow up in 6 months or sooner if needed  - Ok for Imodium prn during diarrhea flares  - Call with any questions or concerns       Orders  No orders of the defined types were placed in this encounter. Medications  Orders Placed This Encounter   Medications    metroNIDAZOLE (FLAGYL) 500 MG tablet     Sig: Take 1 tablet two times daily for 14 days.   Repeat every 3 months     Dispense:  28 tablet     Refill:  3         Patient History:     Past Medical History:   Diagnosis Date    Arthritis     Asthma     Hypertension     Subdural hematoma ([facility] [address] 08/2019    Thyroid disease        Past Surgical History:   Procedure Laterality Date    ABDOMEN SURGERY      COLONOSCOPY  2013    Dr Bunny Atkins @ [address] [place] [address] Gi- normal per pt recall    FRACTURE SURGERY      UPPER GASTROINTESTINAL ENDOSCOPY  2013    Dr Bunny Atkins @ [address] [place] [address] GI- normal       Family History   Problem Relation Age of Onset    Colon Polyps Neg Hx     Colon Cancer Neg Hx     Esophageal Cancer Neg Hx     Liver Cancer Neg Hx     Rectal Cancer Neg Hx     Stomach Cancer Neg Hx        Social History     Socioeconomic History    Marital status:    Tobacco Use    Smoking status: Former     Types: Cigarettes     Quit date: 1982     Years since quittin.7    Smokeless tobacco: Never   Vaping Use    Vaping Use: Never used   Substance and Sexual Activity    Alcohol use: Not Currently    Drug use: Never       Current Outpatient Medications   Medication Sig Dispense Refill    metroNIDAZOLE (FLAGYL) 500 MG tablet Take 1 tablet two times daily for 14 days. Repeat every 3 months 28 tablet 3    Multiple Vitamin (MULTI-VITAMIN DAILY PO) Take 3 tablets by mouth daily      diphenhydrAMINE HCl (BENADRYL PO) Take by mouth as needed      Cholecalciferol (VITAMIN D3) 125 MCG (5000 UT) TABS Take by mouth daily      pantoprazole sodium (PROTONIX) 40 MG PACK packet Take 40 mg by mouth every morning (before breakfast)      denosumab (PROLIA) 60 MG/ML SOSY SC injection Inject 60 mg into the skin once Every 6 months      MORPHINE SULFATE IM Inject 1.09 mg PE/day into the muscle every 24 hours Pain pump      acetaminophen (TYLENOL) 325 MG tablet Take 650 mg by mouth every 4 hours as needed for Pain      lisinopril (PRINIVIL;ZESTRIL) 5 MG tablet Take 5 mg by mouth daily      metoprolol succinate (TOPROL XL) 25 MG extended release tablet Take 12.5 mg by mouth 2 times daily Half tab       memantine (NAMENDA) 10 MG tablet Take 10 mg by mouth 2 times daily       levothyroxine (SYNTHROID) 50 MCG tablet Take 50 mcg by mouth Daily       No current facility-administered medications for this visit. Allergies   Allergen Reactions    Forteo [Parathyroid Hormone (Recomb)] Other (See Comments)     Confusion  weakness      Penicillins Other (See Comments)     unknown    Ciprofloxacin Rash       Review of Systems   Constitutional:  Negative for activity change, appetite change, fatigue, fever and unexpected weight change. HENT:  Negative for trouble swallowing. Respiratory:  Negative for cough, choking and shortness of breath. Cardiovascular:  Negative for chest pain. Gastrointestinal:  Positive for diarrhea. Negative for abdominal distention, abdominal pain, anal bleeding, blood in stool, constipation, nausea, rectal pain and vomiting. Allergic/Immunologic: Negative for food allergies. Neurological:  Positive for weakness. All other systems reviewed and are negative. Objective:     /70   Pulse (!) 109   Ht 5' 9\" (1.753 m)   Wt 138 lb 12.8 oz (63 kg)   SpO2 (!) 88%   BMI 20.50 kg/m²     Physical Exam  Vitals reviewed. Constitutional:       General: He is not in acute distress. Appearance: He is well-developed. HENT:      Head: Normocephalic and atraumatic. Right Ear: External ear normal.      Left Ear: External ear normal.      Nose: Nose normal.   Eyes:      General: No scleral icterus. Right eye: No discharge. Left eye: No discharge. Conjunctiva/sclera: Conjunctivae normal.      Pupils: Pupils are equal, round, and reactive to light. Cardiovascular:      Rate and Rhythm: Normal rate and regular rhythm. Heart sounds: Normal heart sounds. No murmur heard. Pulmonary:      Effort: Pulmonary effort is normal. No respiratory distress. Breath sounds: Normal breath sounds. No wheezing or rales. Abdominal:      General: Bowel sounds are normal. There is no distension. Palpations: Abdomen is soft. There is no mass. Tenderness: There is no abdominal tenderness. There is no guarding or rebound. Musculoskeletal:         General: Normal range of motion. Cervical back: Normal range of motion and neck supple. Skin:     General: Skin is warm and dry. Coloration: Skin is not pale. Neurological:      Mental Status: He is alert and oriented to person, place, and time.    Psychiatric:         Behavior: Behavior normal.

## 2023-03-29 ENCOUNTER — OFFICE VISIT (OUTPATIENT)
Dept: GASTROENTEROLOGY | Age: 83
End: 2023-03-29
Payer: MEDICARE

## 2023-03-29 VITALS
OXYGEN SATURATION: 98 % | SYSTOLIC BLOOD PRESSURE: 120 MMHG | BODY MASS INDEX: 21.19 KG/M2 | WEIGHT: 148 LBS | HEART RATE: 65 BPM | DIASTOLIC BLOOD PRESSURE: 80 MMHG | HEIGHT: 70 IN

## 2023-03-29 DIAGNOSIS — K63.89 SMALL INTESTINAL BACTERIAL OVERGROWTH: Primary | ICD-10-CM

## 2023-03-29 DIAGNOSIS — K52.9 CHRONIC DIARRHEA: ICD-10-CM

## 2023-03-29 PROCEDURE — 1123F ACP DISCUSS/DSCN MKR DOCD: CPT | Performed by: NURSE PRACTITIONER

## 2023-03-29 PROCEDURE — G8420 CALC BMI NORM PARAMETERS: HCPCS | Performed by: NURSE PRACTITIONER

## 2023-03-29 PROCEDURE — 1036F TOBACCO NON-USER: CPT | Performed by: NURSE PRACTITIONER

## 2023-03-29 PROCEDURE — 3074F SYST BP LT 130 MM HG: CPT | Performed by: NURSE PRACTITIONER

## 2023-03-29 PROCEDURE — 99214 OFFICE O/P EST MOD 30 MIN: CPT | Performed by: NURSE PRACTITIONER

## 2023-03-29 PROCEDURE — G8484 FLU IMMUNIZE NO ADMIN: HCPCS | Performed by: NURSE PRACTITIONER

## 2023-03-29 PROCEDURE — 3079F DIAST BP 80-89 MM HG: CPT | Performed by: NURSE PRACTITIONER

## 2023-03-29 PROCEDURE — G8427 DOCREV CUR MEDS BY ELIG CLIN: HCPCS | Performed by: NURSE PRACTITIONER

## 2023-03-29 ASSESSMENT — ENCOUNTER SYMPTOMS
BLOOD IN STOOL: 0
VOMITING: 0
CONSTIPATION: 0
NAUSEA: 0
ABDOMINAL PAIN: 0
SHORTNESS OF BREATH: 0
DIARRHEA: 1
RECTAL PAIN: 0
ABDOMINAL DISTENTION: 0
COUGH: 0
TROUBLE SWALLOWING: 0
CHOKING: 0
ANAL BLEEDING: 0

## 2023-03-29 NOTE — PROGRESS NOTES
abdominal tenderness. There is no guarding or rebound. Musculoskeletal:         General: Normal range of motion. Cervical back: Normal range of motion and neck supple. Skin:     General: Skin is warm and dry. Coloration: Skin is not pale. Neurological:      Mental Status: He is alert and oriented to person, place, and time.    Psychiatric:         Behavior: Behavior normal.

## 2023-03-29 NOTE — PATIENT INSTRUCTIONS
Schedule colonoscopy. No aspirin, ibuprofen, naproxen, fish oil or vitamin E for 5 days before procedure. Do not eat or drink after midnight the day of the procedure. Allowed medications can be taken with a small sip of water. Please review your prep instructions for allowed medications. You will not be able to drive for 24 hours after the procedure due to sedation. You must have a responsible adult, 25 year or older, to accompany you and drive you home the day of your procedure. If you are on blood thinners, clearance from the prescribing physician will be obtained before your procedure is scheduled. If it is determined it is not safe to hold these medications for a short time an alternative procedure for evaluation may be recommended. Risks of colonoscopy include, but are not limited to, perforation, bleeding, and infection, Risk of perforation and bleeding are increased if there is a polyp removed. Anesthesia risks will be reviewed with you before the procedure by a member of the anesthesia department. Your physician may also schedule a follow up appointment with the nurse practitioner to discuss pathology, symptoms or to check if you have had any problems related to your procedure. If you prefer not to return to the office after your procedure please discuss this with your physician on the day of your colonoscopy. The physician will talk with you and/or your family after the procedure is completed. Final recommendations are based on the pathologist report if biopsies or specimens are taken. If polyps are removed during the procedure they will be sent to a pathologist for analysis. Unless you have a follow up appointment scheduled, you will be notified by mail of the pathology results within 4 weeks. If you have not received results after 4 weeks you may call the office to obtain this information. For Colonoscopy:   You will be given specific directions regarding

## 2023-03-31 ENCOUNTER — TELEPHONE (OUTPATIENT)
Dept: GASTROENTEROLOGY | Age: 83
End: 2023-03-31

## 2023-03-31 NOTE — TELEPHONE ENCOUNTER
Patient: Maicol Grubbs    YOB: 1940      Clearance was received on March 31, 2023. for Colonoscopy scheduled for: 4/18/23    Patient may discontinue the use of ELIQUIS for 5  days prior to the procedure.     IS Lovenox required:  NO    PATIENT NOTIFIED ON:  3/31/23      Clearance scanned into media

## 2023-05-24 ENCOUNTER — TELEPHONE (OUTPATIENT)
Dept: GASTROENTEROLOGY | Age: 83
End: 2023-05-24

## 2023-05-24 RX ORDER — METRONIDAZOLE 500 MG/1
TABLET ORAL
Qty: 28 TABLET | Refills: 3 | Status: SHIPPED | OUTPATIENT
Start: 2023-05-24

## 2023-05-24 NOTE — TELEPHONE ENCOUNTER
05- Charlotte Nevarez that patient is needing the Flagyl called in as that he is having problems with his diarrhea again. Per office note 86-  Pt seen today for follow up. Hx chronic diarrhea. Currently taking Flagyl 500 mg x 14 days every 3 months \"if he needs it. \"  He will use Imodium prn.       Last rx 09-      Routed to [facility]

## 2023-09-28 ENCOUNTER — OFFICE VISIT (OUTPATIENT)
Dept: GASTROENTEROLOGY | Age: 83
End: 2023-09-28
Payer: MEDICARE

## 2023-09-28 VITALS
WEIGHT: 144 LBS | OXYGEN SATURATION: 97 % | HEIGHT: 70 IN | HEART RATE: 70 BPM | BODY MASS INDEX: 20.62 KG/M2 | SYSTOLIC BLOOD PRESSURE: 120 MMHG | DIASTOLIC BLOOD PRESSURE: 65 MMHG

## 2023-09-28 DIAGNOSIS — K58.0 IRRITABLE BOWEL SYNDROME WITH DIARRHEA: Primary | ICD-10-CM

## 2023-09-28 DIAGNOSIS — K63.89 SMALL INTESTINAL BACTERIAL OVERGROWTH: ICD-10-CM

## 2023-09-28 PROCEDURE — G8420 CALC BMI NORM PARAMETERS: HCPCS | Performed by: NURSE PRACTITIONER

## 2023-09-28 PROCEDURE — 1123F ACP DISCUSS/DSCN MKR DOCD: CPT | Performed by: NURSE PRACTITIONER

## 2023-09-28 PROCEDURE — 3078F DIAST BP <80 MM HG: CPT | Performed by: NURSE PRACTITIONER

## 2023-09-28 PROCEDURE — 99214 OFFICE O/P EST MOD 30 MIN: CPT | Performed by: NURSE PRACTITIONER

## 2023-09-28 PROCEDURE — 3074F SYST BP LT 130 MM HG: CPT | Performed by: NURSE PRACTITIONER

## 2023-09-28 PROCEDURE — G8427 DOCREV CUR MEDS BY ELIG CLIN: HCPCS | Performed by: NURSE PRACTITIONER

## 2023-09-28 PROCEDURE — 1036F TOBACCO NON-USER: CPT | Performed by: NURSE PRACTITIONER

## 2023-09-28 ASSESSMENT — ENCOUNTER SYMPTOMS
BACK PAIN: 1
TROUBLE SWALLOWING: 0
CONSTIPATION: 0
VOMITING: 0
NAUSEA: 0
ABDOMINAL DISTENTION: 0
DIARRHEA: 1
ANAL BLEEDING: 0
CHOKING: 0
RECTAL PAIN: 0
ABDOMINAL PAIN: 0
SHORTNESS OF BREATH: 0
BLOOD IN STOOL: 0
COUGH: 0

## 2023-09-28 NOTE — PROGRESS NOTES
Subjective:     Patient ID: Sofia Avila is a 82 y.o. male  PCP: Al Brian  Referring Provider: No ref. provider found    HPI  Patient presents to the office today with the following complaints: Follow-up      Pt seen today for follow up. Hx chronic diarrhea, SIBO. Currently treated with Flagyl 500 mg x 14 days every 3 months \"if he needs it. \"  Will use Imodium prn. He is due for Colonoscopy. This was cancelled earlier this year due to DVT and BID Eliquis. It was recommended this not be held for at least 3 months. Today, pt states he has been off the Eliquis for a couple of weeks. He is taking Imodium BID, this is working well for him. Denies any abdominal pain or blood in stool. BM 2-3 times a day, soft to loose stools. Last EGD 2013 - normal PATIENTS [facility])  Last Colonoscopy 2013 - normal per pt ([facility])  Denies any family hx colon cancer or colon polyps    Assessment:     1. Irritable bowel syndrome with diarrhea    2. Small intestinal bacterial overgrowth            Plan:   - Ok to continue Imodium   - Follow up yearly or sooner if needed   - Schedule Colonoscopy  Instruct on bowel prep. Nothing to eat or drink after midnight the day of the exam.  Unable to drive for 24 hours after the procedure. No aspirin or nonsteroidal anti-inflammatories for 5 days before procedure. I have discussed the benefits, alternatives, and risks (including bleeding, perforation and death)  for pursuing Endoscopy (EGD/Colonscopy/EUS/ERCP) with the patient and they are willing to continue. We also discussed the need for anesthesia, IV access, proper dietary changes, medication changes if necessary, and need for bowel prep (if ordered) prior to their Endoscopic procedure. They are aware they must have someone accompany them to their scheduled procedure to drive them home - they agree to the above and are willing to continue.        Orders  No orders of the defined types were placed in this

## 2023-10-03 ENCOUNTER — HOSPITAL ENCOUNTER (OUTPATIENT)
Age: 83
Setting detail: OUTPATIENT SURGERY
Discharge: HOME | End: 2023-10-03
Attending: INTERNAL MEDICINE | Admitting: INTERNAL MEDICINE
Payer: MEDICARE

## 2023-10-03 ENCOUNTER — ANESTHESIA (OUTPATIENT)
Dept: OPERATING ROOM | Age: 83
End: 2023-10-03

## 2023-10-03 ENCOUNTER — ANESTHESIA EVENT (OUTPATIENT)
Dept: OPERATING ROOM | Age: 83
End: 2023-10-03

## 2023-10-03 ENCOUNTER — APPOINTMENT (OUTPATIENT)
Dept: OPERATING ROOM | Age: 83
End: 2023-10-03
Attending: INTERNAL MEDICINE

## 2023-10-03 ENCOUNTER — HOSPITAL ENCOUNTER (OUTPATIENT)
Age: 83
Setting detail: SPECIMEN
Discharge: HOME | End: 2023-10-03
Payer: MEDICARE

## 2023-10-03 VITALS
DIASTOLIC BLOOD PRESSURE: 78 MMHG | OXYGEN SATURATION: 94 % | TEMPERATURE: 97.6 F | RESPIRATION RATE: 18 BRPM | WEIGHT: 144 LBS | HEIGHT: 70 IN | BODY MASS INDEX: 20.62 KG/M2 | HEART RATE: 83 BPM | SYSTOLIC BLOOD PRESSURE: 109 MMHG

## 2023-10-03 PROCEDURE — G8918 PT W/O PREOP ORDER IV AB PRO: HCPCS

## 2023-10-03 PROCEDURE — 88305 TISSUE EXAM BY PATHOLOGIST: CPT

## 2023-10-03 PROCEDURE — 45380 COLONOSCOPY AND BIOPSY: CPT | Performed by: INTERNAL MEDICINE

## 2023-10-03 PROCEDURE — 45380 COLONOSCOPY AND BIOPSY: CPT

## 2023-10-03 PROCEDURE — G8907 PT DOC NO EVENTS ON DISCHARG: HCPCS

## 2023-10-03 PROCEDURE — 45385 COLONOSCOPY W/LESION REMOVAL: CPT

## 2023-10-03 PROCEDURE — 45385 COLONOSCOPY W/LESION REMOVAL: CPT | Performed by: INTERNAL MEDICINE

## 2023-10-03 RX ORDER — SODIUM CHLORIDE, SODIUM LACTATE, POTASSIUM CHLORIDE, CALCIUM CHLORIDE 600; 310; 30; 20 MG/100ML; MG/100ML; MG/100ML; MG/100ML
INJECTION, SOLUTION INTRAVENOUS CONTINUOUS PRN
Status: DISCONTINUED | OUTPATIENT
Start: 2023-10-03 | End: 2023-10-03 | Stop reason: SDUPTHER

## 2023-10-03 RX ORDER — GLYCOPYRROLATE 0.6MG/3ML
SYRINGE (ML) INTRAVENOUS PRN
Status: DISCONTINUED | OUTPATIENT
Start: 2023-10-03 | End: 2023-10-03 | Stop reason: SDUPTHER

## 2023-10-03 RX ORDER — SODIUM CHLORIDE, SODIUM LACTATE, POTASSIUM CHLORIDE, CALCIUM CHLORIDE 600; 310; 30; 20 MG/100ML; MG/100ML; MG/100ML; MG/100ML
INJECTION, SOLUTION INTRAVENOUS CONTINUOUS
Status: DISCONTINUED | OUTPATIENT
Start: 2023-10-03 | End: 2023-10-03 | Stop reason: HOSPADM

## 2023-10-03 RX ORDER — PROPOFOL 10 MG/ML
INJECTION, EMULSION INTRAVENOUS PRN
Status: DISCONTINUED | OUTPATIENT
Start: 2023-10-03 | End: 2023-10-03 | Stop reason: SDUPTHER

## 2023-10-03 RX ORDER — LIDOCAINE HYDROCHLORIDE 10 MG/ML
INJECTION, SOLUTION EPIDURAL; INFILTRATION; INTRACAUDAL; PERINEURAL PRN
Status: DISCONTINUED | OUTPATIENT
Start: 2023-10-03 | End: 2023-10-03 | Stop reason: SDUPTHER

## 2023-10-03 RX ADMIN — PROPOFOL 30 MG: 10 INJECTION, EMULSION INTRAVENOUS at 14:20

## 2023-10-03 RX ADMIN — SODIUM CHLORIDE, SODIUM LACTATE, POTASSIUM CHLORIDE, CALCIUM CHLORIDE: 600; 310; 30; 20 INJECTION, SOLUTION INTRAVENOUS at 11:44

## 2023-10-03 RX ADMIN — LIDOCAINE HYDROCHLORIDE 30 MG: 10 INJECTION, SOLUTION EPIDURAL; INFILTRATION; INTRACAUDAL; PERINEURAL at 14:19

## 2023-10-03 RX ADMIN — PROPOFOL 20 MG: 10 INJECTION, EMULSION INTRAVENOUS at 14:22

## 2023-10-03 RX ADMIN — Medication 0.2 MG: at 14:11

## 2023-10-03 RX ADMIN — PROPOFOL 10 MG: 10 INJECTION, EMULSION INTRAVENOUS at 14:38

## 2023-10-03 RX ADMIN — PROPOFOL 20 MG: 10 INJECTION, EMULSION INTRAVENOUS at 14:26

## 2023-10-03 RX ADMIN — SODIUM CHLORIDE, SODIUM LACTATE, POTASSIUM CHLORIDE, CALCIUM CHLORIDE: 600; 310; 30; 20 INJECTION, SOLUTION INTRAVENOUS at 14:07

## 2023-10-03 NOTE — DISCHARGE INSTRUCTIONS
1. Repeat colonoscopy: pending pathology -if biopsies reveal microscopic colitis, in 3 months with biopsies for surveillance after appropriate therapy. Otherwise patient does not need any routine colon cancer screening exams due to his age unless the rectal polyp removed today r is a tubulovillous adenoma or a sessile serrated adenoma or an adenoma with advanced features including dysplasia. 2. Await biopsy results-you will receive a letter with your results within 7-10 days    - Resume previous meds and diet  - GI clinic f/u 6-8 weeks with Ms. Nemiah Mortimer scheduled f/u appts with other MDs     - NO ASA/NSAIDs x 2 weeks     NSAIDS Non-steroidal Anti-Inflammatory  You have been directed by your physician to avoid any NSAID's; the following medications are a list of those to avoid. If you think that you are taking any NSAID's notify your physician. Over The Counter  Advil                      Motrin  Nuprin                   Ibuprofen  Midol                     Aleve  Naproxen              Orudis  Aspirin                   Rekha-Pride    Prescriptions and Generics  Cataflam              Relafen  Voltaren               Clinoril  Indocin                 Naproxen  Arthrotec              Lodine  Daypro                 Nalfon  Toradol                Ansaid  Feldene               Meclofenamate  Fenoprofen          Ponstel  Mobic                   Celebrex  POST-OP ORDERS: ENDOSCOPY & COLONOSCOPY:    1. Rest today. 2. DO NOT eat or drink until wide awake; eat your usual diet today in moderate amount only. 3. DO NOT drive today. 4. Call physician if you have severe pain, vomiting, fever, rectal bleeding or black bowel movements. 5.  If a biopsy was taken or a polyp removed, you should expect to hear results in about 7-10 days. If you have heard nothing from your physician by then, call the office for results. 6.  Discharge home when patient awake, vitals signs stable and tolerating liquids.     7.

## 2023-10-03 NOTE — H&P
Patient Name: Eitan Khan  : 1940  MRN: [id number]  DATE: 10/03/23    Allergies: Allergies   Allergen Reactions    Forteo [Parathyroid Hormone (Recomb)] Other (See Comments)     Confusion  weakness      Parathyroid Hormone (Human Recombinant) [Teriparatide] Other (See Comments)     Confusion  weakness    Duloxetine Hallucinations    Penicillins Other (See Comments)     unknown    Ciprofloxacin Rash        ENDOSCOPY  History and Physical    Procedure:    [x] Diagnostic Colonoscopy       [] Screening Colonoscopy  [] EGD      [] ERCP      [] EUS       [] Other    [x] Previous office notes/History and Physical reviewed from the patients chart. Please see EMR for further details of HPI. I have examined the patient's status immediately prior to the procedure and:      Indications/HPI:       1. Irritable bowel syndrome with diarrhea    2. Small intestinal bacterial overgrowth        Last [facility]GD  - normal ([facility]  Last Colonoscopy 2013 - normal per pt ([facility])  Denies any family hx colon cancer or colon polyps     []Abdominal Pain   []Cancer- GI/Lung     []Fhx of colon CA/polyps  []History of Polyps  []Barretts            []Melena  []Abnormal Imaging              []Dysphagia              []Persistent Pneumonia   []Anemia                            []Food Impaction        []History of Polyps  [] GI Bleed             []Pulmonary nodule/Mass   []Change in bowel habits []Heartburn/Reflux  []Rectal Bleed (BRBPR)  []Chest Pain - Non Cardiac []Heme (+) Stool []Ulcers  []Constipation  []Hemoptysis  []Varices  []Diarrhea  []Hypoxemia    []Nausea/Vomiting   []Screening   []Crohns/Colitis  []Other:     Anesthesia:   [x] MAC [] Moderate Sedation   [] General   [] None     ROS: 12 pt Review of Symptoms was negative unless mentioned above    Medications:   Prior to Admission medications    Medication Sig Start Date End Date Taking?  Authorizing Provider   Fexofenadine HCl (ALLEGRA ALLERGY PO) Take by mouth daily

## 2023-10-03 NOTE — ANESTHESIA POSTPROCEDURE EVALUATION
Department of Anesthesiology  Postprocedure Note    Patient: Gary Dunlap  MRN: [id number]  YOB: 1940  Date of evaluation: 10/3/2023      Procedure Summary     Date: 10/03/23 Room / Location: [facility] 02 / [address]    Anesthesia Start: 0027 Anesthesia Stop: 4448    Procedures:       COLONOSCOPY WITH BIOPSY (Abdomen)      COLONOSCOPY POLYPECTOMY SNARE/COLD BIOPSY (Abdomen) Diagnosis:       Screen for colon cancer      (Screen for colon cancer [Z12.11])    Surgeons: Crista Lewis MD Responsible Provider: DB Casillas CRNA    Anesthesia Type: general, TIVA ASA Status: 2          Anesthesia Type: No value filed.     Mika Phase I: Mika Score: 10    Mika Phase II:        Anesthesia Post Evaluation    Patient location during evaluation: bedside  Patient participation: complete - patient participated  Level of consciousness: awake  Pain score: 0  Airway patency: patent  Nausea & Vomiting: no vomiting and no nausea  Complications: no  Cardiovascular status: blood pressure returned to baseline and hemodynamically stable  Respiratory status: nasal airway  Hydration status: stable  Pain management: adequate

## 2023-10-05 ENCOUNTER — TELEPHONE (OUTPATIENT)
Dept: GASTROENTEROLOGY | Age: 83
End: 2023-10-05

## 2023-10-05 NOTE — TELEPHONE ENCOUNTER
Findings: An approximately 8 to 10 mm in diameter sessile trilobed polyp in the distal rectum was removed by hot snare polypectomy technique. The mucosa appeared normal throughout the entire examined colon the examined terminal ileum. Moderate to severe diverticulosis noted throughout the colon which may be a contributing factor to some of his symptoms. NO large polyps or masses or strictures or colitis or overt IBD. Random cold biopsies were tInternal hemorrhoids-Grade 2  Retroflexion in the rectum was otherwise normal and revealed no further abnormalities       Recommendations:  1. Repeat colonoscopy: pending pathology -if biopsies reveal microscopic colitis, in 3 months with biopsies for surveillance after appropriate therapy. Otherwise patient does not need any routine colon cancer screening exams due to his age unless the rectal polyp removed today r is a tubulovillous adenoma or a sessile serrated adenoma or an adenoma with advanced features including dysplasia. 2. Await biopsy results-you will receive a letter with your results within 7-10 days     - Resume previous meds and diet  - GI clinic f/u 6-8 weeks with Ms. Charlotte Welsh scheduled f/u appts with other MDs      - NO ASA/NSAIDs x 2 weeks      Findings and recommendations were discussed w/ the patient. A copy of the images was provided. (Please note that portions of this note were completed with a voice recognition program. Efforts were made to edit the dictations but occasionally words may be mis-transcribed.)      Rudine Hatchet, MD, MD  10/3/2023  2:22 PM      Last visit [id number] [facility] 9-28-23. CLN per  10-3-23, see above. FU 11-14-23 with VISHNU. Patient's wife Rufino Elder called today with update post CLN from [phone].  The wife said around midnight on the day of the CLN she had to take Jose Mayo to their local Hospital in [place] and he was then transferred to [facility] where he is currently an in patient due Protopic Counseling: Patient may experience a mild burning sensation during topical application. Protopic is not approved in children less than 2 years of age. There have been case reports of hematologic and skin malignancies in patients using topical calcineurin inhibitors although causality is questionable.

## 2024-02-08 RX ORDER — METRONIDAZOLE 500 MG/1
TABLET ORAL
Qty: 28 TABLET | Refills: 3 | Status: SHIPPED | OUTPATIENT
Start: 2024-02-08

## 2024-02-08 NOTE — TELEPHONE ENCOUNTER
This is actually Rebeca's patient, but I read her notes and she has been treating Miles every 3 months with Flagyl so I have refilled his RX

## 2024-02-08 NOTE — TELEPHONE ENCOUNTER
02- Called Awilda (Wife) She stated that he is having lots of diarrhea about 4-6 times daily.  She stated that eating makes it worse so he has been trying not to eat.  However she has been getting onto him about not eating.     She mentioned that he has not been on the Flagyl in a while and they just wanted to try it and see if it would help.     Questioned if the patient has tried any over the counter medications?  She stated that he takes several Imodium daily.      They have an apt on 02-     Routed to CT APRN

## 2024-02-21 ENCOUNTER — OFFICE VISIT (OUTPATIENT)
Dept: GASTROENTEROLOGY | Age: 84
End: 2024-02-21

## 2024-02-21 VITALS
HEART RATE: 61 BPM | WEIGHT: 152 LBS | HEIGHT: 70 IN | SYSTOLIC BLOOD PRESSURE: 118 MMHG | BODY MASS INDEX: 21.76 KG/M2 | DIASTOLIC BLOOD PRESSURE: 70 MMHG | OXYGEN SATURATION: 98 %

## 2024-02-21 DIAGNOSIS — K63.8219 SMALL INTESTINAL BACTERIAL OVERGROWTH: ICD-10-CM

## 2024-02-21 DIAGNOSIS — K58.0 IRRITABLE BOWEL SYNDROME WITH DIARRHEA: Primary | ICD-10-CM

## 2024-02-21 NOTE — PROGRESS NOTES
Subjective:     Patient ID: Miles Mcneill is a 83 y.o. male  PCP: Kyler Bustos MD  Referring Provider: No ref. provider found    HPI  Patient presents to the office today with the following complaints: Diarrhea      Pt seen today for follow up.  Hx chronic diarrhea, SIBO.  Treated with Flagyl x 14 days every 3 months or so.  Just finished treatment.  BM now 3-4 times a day.  Stools are loose, type 6 on stool chart.  Denies any blood in stool.            Last Colonoscopy 10/3/2023 - (-)Micro Colitis, TA (-)Dysplasia, Mod to severe diverticulosis throughout colon-maybe contributing factor to symptoms, int hem Gr 2, no recall   Denies any family hx colon cancer or colon polyps    Assessment:     1. Irritable bowel syndrome with diarrhea    2. Small intestinal bacterial overgrowth            Plan:   - Will continue Flagyl 500 mg BID x 14 days every 3 months   - Begin round of Xifaxan 550 mg TID x 14 days should symptoms relapse   - Follow up in 3 months or sooner if needed   - Call with any questions or concerns       Orders  No orders of the defined types were placed in this encounter.    Medications  Orders Placed This Encounter   Medications    rifAXIMin (XIFAXAN) 550 MG tablet     Sig: Take 1 tablet by mouth 3 times daily for 14 days     Dispense:  42 tablet     Refill:  0         Patient History:     Past Medical History:   Diagnosis Date    Arthritis     Asthma     Hypertension     Subdural hematoma (HCC) 08/2019    Thyroid disease        Past Surgical History:   Procedure Laterality Date    ABDOMEN SURGERY      COLONOSCOPY  2013    Dr Lopez @ [facility]- normal per pt recall    COLONOSCOPY N/A 10/03/2023    Dr Cochran, (-)Micro Colitis, TA (-)Dysplasia, Mod to severe diverticulosis throughout colon-maybe contributing factor to symptoms, int hem Gr 2, no recall    COLONOSCOPY N/A 10/03/2023    Duplicate    FRACTURE SURGERY      UPPER GASTROINTESTINAL ENDOSCOPY  2013    Dr Lopez @ [facility] GI- normal

## 2024-02-22 ASSESSMENT — ENCOUNTER SYMPTOMS
DIARRHEA: 1
COUGH: 0
VOMITING: 0
TROUBLE SWALLOWING: 0
ANAL BLEEDING: 0
NAUSEA: 0
ABDOMINAL PAIN: 0
ABDOMINAL DISTENTION: 0
CHOKING: 0
CONSTIPATION: 0
RECTAL PAIN: 0
BLOOD IN STOOL: 0
SHORTNESS OF BREATH: 0

## 2024-05-28 ENCOUNTER — OFFICE VISIT (OUTPATIENT)
Dept: GASTROENTEROLOGY | Age: 84
End: 2024-05-28
Payer: MEDICARE

## 2024-05-28 VITALS
SYSTOLIC BLOOD PRESSURE: 118 MMHG | DIASTOLIC BLOOD PRESSURE: 68 MMHG | BODY MASS INDEX: 21.18 KG/M2 | HEIGHT: 69 IN | HEART RATE: 73 BPM | WEIGHT: 143 LBS | OXYGEN SATURATION: 97 %

## 2024-05-28 DIAGNOSIS — K58.0 IRRITABLE BOWEL SYNDROME WITH DIARRHEA: Primary | ICD-10-CM

## 2024-05-28 PROCEDURE — 1123F ACP DISCUSS/DSCN MKR DOCD: CPT | Performed by: NURSE PRACTITIONER

## 2024-05-28 PROCEDURE — 1036F TOBACCO NON-USER: CPT | Performed by: NURSE PRACTITIONER

## 2024-05-28 PROCEDURE — 3078F DIAST BP <80 MM HG: CPT | Performed by: NURSE PRACTITIONER

## 2024-05-28 PROCEDURE — 3074F SYST BP LT 130 MM HG: CPT | Performed by: NURSE PRACTITIONER

## 2024-05-28 PROCEDURE — 99214 OFFICE O/P EST MOD 30 MIN: CPT | Performed by: NURSE PRACTITIONER

## 2024-05-28 PROCEDURE — G8427 DOCREV CUR MEDS BY ELIG CLIN: HCPCS | Performed by: NURSE PRACTITIONER

## 2024-05-28 PROCEDURE — G8420 CALC BMI NORM PARAMETERS: HCPCS | Performed by: NURSE PRACTITIONER

## 2024-05-28 ASSESSMENT — ENCOUNTER SYMPTOMS
ANAL BLEEDING: 0
DIARRHEA: 1
SHORTNESS OF BREATH: 0
NAUSEA: 0
CHOKING: 0
ABDOMINAL PAIN: 0
VOMITING: 0
RECTAL PAIN: 0
TROUBLE SWALLOWING: 0
BLOOD IN STOOL: 0
COUGH: 0
ABDOMINAL DISTENTION: 0
CONSTIPATION: 0

## 2024-05-28 NOTE — PROGRESS NOTES
oriented to person, place, and time.   Psychiatric:         Mood and Affect: Mood normal.         Behavior: Behavior normal.

## 2024-07-16 DIAGNOSIS — K58.0 IRRITABLE BOWEL SYNDROME WITH DIARRHEA: ICD-10-CM

## 2024-07-16 RX ORDER — RIFAXIMIN 550 MG/1
TABLET ORAL
Qty: 42 TABLET | Refills: 0 | Status: SHIPPED | OUTPATIENT
Start: 2024-07-16

## 2024-09-29 NOTE — PROGRESS NOTES
Subjective:     Patient ID: Miles Mcneill is a 83 y.o. male  PCP: Kyler Bustos MD  Referring Provider: No ref. provider found    HPI  Patient presents to the office today with the following complaints: 3 Month Follow-Up      Pt seen today for follow up.  Hx chronic diarrhea, SIBO.  Treated with Flagyl x 14 days every 3 months or so.  Round of Xifaxan was prescribed at last OV.  This was completed in June.  Pt denies any further diarrhea since that time.  He states he is doing well.  Currently following Cardiology for Atrial Fibrillation.            Last Colonoscopy 10/3/2023 - (-)Micro Colitis, TA (-)Dysplasia, Mod to severe diverticulosis throughout colon-maybe contributing factor to symptoms, int hem Gr 2, no recall   Denies any family hx colon cancer or colon polyps    LAST OV 5/28/2024   Pt seen today for follow up.  Hx chronic diarrhea, SIBO.  Treated with Flagyl x 14 days every 3 months or so.  Currently on treatment.  BM now 3-4 times a day.  Stools are loose, type 6 on stool chart.  Denies any blood in stool.  He will have some nocturnal diarrhea with this.                Assessment:     1. Irritable bowel syndrome with diarrhea    2. Small intestinal bacterial overgrowth              Plan:   - Will continue Flagyl 500 mg BID x 14 days every 3 months   - Consider repeat round of Xifaxan 550 mg TID x 14 days if needed  - Follow up in 6 months or sooner if needed   - Call with any questions or concerns       Orders  No orders of the defined types were placed in this encounter.    Medications  No orders of the defined types were placed in this encounter.        Patient History:     Past Medical History:   Diagnosis Date    Arthritis     Asthma     Hypertension     Subdural hematoma 08/2019    Thyroid disease        Past Surgical History:   Procedure Laterality Date    ABDOMEN SURGERY      COLONOSCOPY  2013    Dr Lopez @ [facility] Gi- normal per pt recall    COLONOSCOPY N/A 10/03/2023    Dr Cochran,

## 2024-09-30 ENCOUNTER — OFFICE VISIT (OUTPATIENT)
Dept: GASTROENTEROLOGY | Age: 84
End: 2024-09-30
Payer: MEDICARE

## 2024-09-30 VITALS
BODY MASS INDEX: 20.44 KG/M2 | DIASTOLIC BLOOD PRESSURE: 70 MMHG | WEIGHT: 138 LBS | SYSTOLIC BLOOD PRESSURE: 126 MMHG | HEIGHT: 69 IN

## 2024-09-30 DIAGNOSIS — K63.8219 SMALL INTESTINAL BACTERIAL OVERGROWTH: ICD-10-CM

## 2024-09-30 DIAGNOSIS — K58.0 IRRITABLE BOWEL SYNDROME WITH DIARRHEA: Primary | ICD-10-CM

## 2024-09-30 PROCEDURE — 1123F ACP DISCUSS/DSCN MKR DOCD: CPT | Performed by: NURSE PRACTITIONER

## 2024-09-30 PROCEDURE — G8420 CALC BMI NORM PARAMETERS: HCPCS | Performed by: NURSE PRACTITIONER

## 2024-09-30 PROCEDURE — 1036F TOBACCO NON-USER: CPT | Performed by: NURSE PRACTITIONER

## 2024-09-30 PROCEDURE — 3078F DIAST BP <80 MM HG: CPT | Performed by: NURSE PRACTITIONER

## 2024-09-30 PROCEDURE — G8427 DOCREV CUR MEDS BY ELIG CLIN: HCPCS | Performed by: NURSE PRACTITIONER

## 2024-09-30 PROCEDURE — 3074F SYST BP LT 130 MM HG: CPT | Performed by: NURSE PRACTITIONER

## 2024-09-30 PROCEDURE — 99214 OFFICE O/P EST MOD 30 MIN: CPT | Performed by: NURSE PRACTITIONER

## 2024-09-30 RX ORDER — CARVEDILOL 3.12 MG/1
TABLET ORAL
COMMUNITY

## 2024-09-30 RX ORDER — AMIODARONE HYDROCHLORIDE 200 MG/1
200 TABLET ORAL DAILY
COMMUNITY
Start: 2024-09-26 | End: 2025-03-26

## 2024-09-30 ASSESSMENT — ENCOUNTER SYMPTOMS: DIARRHEA: 1

## 2025-06-11 ENCOUNTER — OFFICE VISIT (OUTPATIENT)
Dept: GASTROENTEROLOGY | Age: 85
End: 2025-06-11
Payer: MEDICARE

## 2025-06-11 VITALS
WEIGHT: 144 LBS | HEIGHT: 69 IN | DIASTOLIC BLOOD PRESSURE: 72 MMHG | BODY MASS INDEX: 21.33 KG/M2 | HEART RATE: 86 BPM | SYSTOLIC BLOOD PRESSURE: 126 MMHG

## 2025-06-11 DIAGNOSIS — K63.8219 SMALL INTESTINAL BACTERIAL OVERGROWTH: ICD-10-CM

## 2025-06-11 DIAGNOSIS — K58.0 IRRITABLE BOWEL SYNDROME WITH DIARRHEA: Primary | ICD-10-CM

## 2025-06-11 DIAGNOSIS — K56.609 SMALL BOWEL OBSTRUCTION (HCC): ICD-10-CM

## 2025-06-11 PROCEDURE — 1123F ACP DISCUSS/DSCN MKR DOCD: CPT | Performed by: NURSE PRACTITIONER

## 2025-06-11 PROCEDURE — 1159F MED LIST DOCD IN RCRD: CPT | Performed by: NURSE PRACTITIONER

## 2025-06-11 PROCEDURE — G8420 CALC BMI NORM PARAMETERS: HCPCS | Performed by: NURSE PRACTITIONER

## 2025-06-11 PROCEDURE — G8427 DOCREV CUR MEDS BY ELIG CLIN: HCPCS | Performed by: NURSE PRACTITIONER

## 2025-06-11 PROCEDURE — 3074F SYST BP LT 130 MM HG: CPT | Performed by: NURSE PRACTITIONER

## 2025-06-11 PROCEDURE — 3078F DIAST BP <80 MM HG: CPT | Performed by: NURSE PRACTITIONER

## 2025-06-11 PROCEDURE — 1036F TOBACCO NON-USER: CPT | Performed by: NURSE PRACTITIONER

## 2025-06-11 PROCEDURE — 99214 OFFICE O/P EST MOD 30 MIN: CPT | Performed by: NURSE PRACTITIONER

## 2025-06-11 ASSESSMENT — ENCOUNTER SYMPTOMS
BLOOD IN STOOL: 0
CONSTIPATION: 0
NAUSEA: 0
CHOKING: 0
SHORTNESS OF BREATH: 0
RECTAL PAIN: 0
VOMITING: 0
ABDOMINAL PAIN: 0
ANAL BLEEDING: 0
COUGH: 0
ABDOMINAL DISTENTION: 0
TROUBLE SWALLOWING: 0
DIARRHEA: 1

## 2025-06-11 NOTE — PROGRESS NOTES
Gait: Gait abnormal (uses walker).   Psychiatric:         Mood and Affect: Mood normal.         Behavior: Behavior normal.             The patient (or guardian, if applicable) and other individuals in attendance with the patient were advised that Artificial Intelligence will be utilized during this visit to record, process the conversation to generate a clinical note, and support improvement of the AI technology. The patient (or guardian, if applicable) and other individuals in attendance at the appointment consented to the use of AI, including the recording.

## 2025-06-24 ENCOUNTER — TELEPHONE (OUTPATIENT)
Dept: GASTROENTEROLOGY | Age: 85
End: 2025-06-24

## 2025-06-24 RX ORDER — METRONIDAZOLE 500 MG/1
TABLET ORAL
Qty: 28 TABLET | Refills: 3 | Status: SHIPPED | OUTPATIENT
Start: 2025-06-24

## 2025-06-24 NOTE — TELEPHONE ENCOUNTER
Teresa,    Pt's wife called saying that the Xifaxan is going to cost $615 and they can not afford that.    She said something about another medication he was on before. And wanted to know if you can call that in for him.    Thanks, Kaycee Smith MA

## (undated) DEVICE — SINGLE PORT MANIFOLD: Brand: NEPTUNE 2

## (undated) DEVICE — ENDO KIT,LOURDES HOSPITAL: Brand: MEDLINE INDUSTRIES, INC.

## (undated) DEVICE — ADAPTER CLEANING PORPOISE CLEANING

## (undated) DEVICE — BRUSH ENDOSCP 2 END CHN HEDGEHOG

## (undated) DEVICE — SPONGE ENDOSCP CLN BS INF PREVENTION KOALA

## (undated) DEVICE — CANNULA NSL AD L7FT DIV O2 CO2 W/ M LUERLOCK TRMPT CONN